# Patient Record
Sex: MALE | Race: BLACK OR AFRICAN AMERICAN | Employment: OTHER | URBAN - METROPOLITAN AREA
[De-identification: names, ages, dates, MRNs, and addresses within clinical notes are randomized per-mention and may not be internally consistent; named-entity substitution may affect disease eponyms.]

---

## 2021-03-05 ENCOUNTER — HOSPITAL ENCOUNTER (INPATIENT)
Age: 41
LOS: 4 days | Discharge: HOME | DRG: 286 | End: 2021-03-09
Attending: EMERGENCY MEDICINE | Admitting: INTERNAL MEDICINE
Payer: COMMERCIAL

## 2021-03-05 ENCOUNTER — APPOINTMENT (OUTPATIENT)
Dept: GENERAL RADIOLOGY | Age: 41
DRG: 286 | End: 2021-03-05
Attending: EMERGENCY MEDICINE
Payer: COMMERCIAL

## 2021-03-05 ENCOUNTER — APPOINTMENT (OUTPATIENT)
Dept: CT IMAGING | Age: 41
DRG: 286 | End: 2021-03-05
Attending: EMERGENCY MEDICINE
Payer: COMMERCIAL

## 2021-03-05 ENCOUNTER — NURSE TRIAGE (OUTPATIENT)
Dept: OTHER | Facility: CLINIC | Age: 41
End: 2021-03-05

## 2021-03-05 DIAGNOSIS — I10 ESSENTIAL HYPERTENSION: ICD-10-CM

## 2021-03-05 DIAGNOSIS — I50.23 SYSTOLIC CHF, ACUTE ON CHRONIC (HCC): ICD-10-CM

## 2021-03-05 DIAGNOSIS — I42.9 CARDIOMYOPATHY (HCC): ICD-10-CM

## 2021-03-05 DIAGNOSIS — R06.02 SOB (SHORTNESS OF BREATH): ICD-10-CM

## 2021-03-05 DIAGNOSIS — I50.20 SYSTOLIC CONGESTIVE HEART FAILURE, UNSPECIFIED HF CHRONICITY (HCC): ICD-10-CM

## 2021-03-05 DIAGNOSIS — Z20.822 SUSPECTED COVID-19 VIRUS INFECTION: Primary | ICD-10-CM

## 2021-03-05 DIAGNOSIS — R06.09 DOE (DYSPNEA ON EXERTION): ICD-10-CM

## 2021-03-05 PROBLEM — I50.21 SYSTOLIC CHF, ACUTE (HCC): Status: ACTIVE | Noted: 2021-03-05

## 2021-03-05 PROBLEM — R73.03 PREDIABETES: Status: ACTIVE | Noted: 2021-03-05

## 2021-03-05 LAB
ALBUMIN SERPL-MCNC: 3.2 G/DL (ref 3.4–5)
ALBUMIN/GLOB SERPL: 0.9 {RATIO} (ref 0.8–1.7)
ALP SERPL-CCNC: 129 U/L (ref 45–117)
ALT SERPL-CCNC: 58 U/L (ref 16–61)
ANION GAP SERPL CALC-SCNC: 6 MMOL/L (ref 3–18)
APPEARANCE UR: CLEAR
APTT PPP: 31.5 SEC (ref 23–36.4)
ARTERIAL PATENCY WRIST A: YES
AST SERPL-CCNC: 33 U/L (ref 10–38)
ATRIAL RATE: 99 BPM
BASE EXCESS BLD CALC-SCNC: 1 MMOL/L
BASOPHILS # BLD: 0 K/UL (ref 0–0.1)
BASOPHILS NFR BLD: 0 % (ref 0–2)
BDY SITE: ABNORMAL
BILIRUB SERPL-MCNC: 0.6 MG/DL (ref 0.2–1)
BILIRUB UR QL: NEGATIVE
BNP SERPL-MCNC: 1097 PG/ML (ref 0–450)
BUN SERPL-MCNC: 15 MG/DL (ref 7–18)
BUN/CREAT SERPL: 16 (ref 12–20)
CALCIUM SERPL-MCNC: 8.3 MG/DL (ref 8.5–10.1)
CALCULATED R AXIS, ECG10: -12 DEGREES
CALCULATED T AXIS, ECG11: -103 DEGREES
CHLORIDE SERPL-SCNC: 107 MMOL/L (ref 100–111)
CK MB CFR SERPL CALC: 1.2 % (ref 0–4)
CK MB SERPL-MCNC: 1.7 NG/ML (ref 5–25)
CK SERPL-CCNC: 141 U/L (ref 39–308)
CO2 SERPL-SCNC: 25 MMOL/L (ref 21–32)
COLOR UR: YELLOW
CREAT SERPL-MCNC: 0.95 MG/DL (ref 0.6–1.3)
D DIMER PPP FEU-MCNC: 1.4 UG/ML(FEU)
DIAGNOSIS, 93000: NORMAL
DIFFERENTIAL METHOD BLD: ABNORMAL
EOSINOPHIL # BLD: 0.2 K/UL (ref 0–0.4)
EOSINOPHIL NFR BLD: 3 % (ref 0–5)
ERYTHROCYTE [DISTWIDTH] IN BLOOD BY AUTOMATED COUNT: 13.8 % (ref 11.6–14.5)
EST. AVERAGE GLUCOSE BLD GHB EST-MCNC: 117 MG/DL
GAS FLOW.O2 O2 DELIVERY SYS: ABNORMAL L/MIN
GLOBULIN SER CALC-MCNC: 3.4 G/DL (ref 2–4)
GLUCOSE BLD STRIP.AUTO-MCNC: 187 MG/DL (ref 70–110)
GLUCOSE BLD STRIP.AUTO-MCNC: 223 MG/DL (ref 70–110)
GLUCOSE SERPL-MCNC: 181 MG/DL (ref 74–99)
GLUCOSE UR STRIP.AUTO-MCNC: NEGATIVE MG/DL
HBA1C MFR BLD: 5.7 % (ref 4.2–5.6)
HCO3 BLD-SCNC: 24.9 MMOL/L (ref 22–26)
HCT VFR BLD AUTO: 37.7 % (ref 36–48)
HGB BLD-MCNC: 12.2 G/DL (ref 13–16)
HGB UR QL STRIP: NEGATIVE
INR PPP: 1.2 (ref 0.8–1.2)
KETONES UR QL STRIP.AUTO: NEGATIVE MG/DL
LACTATE BLD-SCNC: 1.46 MMOL/L (ref 0.4–2)
LEUKOCYTE ESTERASE UR QL STRIP.AUTO: NEGATIVE
LYMPHOCYTES # BLD: 2 K/UL (ref 0.9–3.6)
LYMPHOCYTES NFR BLD: 23 % (ref 21–52)
MAGNESIUM SERPL-MCNC: 2.1 MG/DL (ref 1.6–2.6)
MCH RBC QN AUTO: 29.3 PG (ref 24–34)
MCHC RBC AUTO-ENTMCNC: 32.4 G/DL (ref 31–37)
MCV RBC AUTO: 90.4 FL (ref 74–97)
MONOCYTES # BLD: 0.6 K/UL (ref 0.05–1.2)
MONOCYTES NFR BLD: 7 % (ref 3–10)
NEUTS SEG # BLD: 5.8 K/UL (ref 1.8–8)
NEUTS SEG NFR BLD: 67 % (ref 40–73)
NITRITE UR QL STRIP.AUTO: NEGATIVE
O2/TOTAL GAS SETTING VFR VENT: 21 %
P-R INTERVAL, ECG05: 162 MS
PCO2 BLD: 36.8 MMHG (ref 35–45)
PH BLD: 7.44 [PH] (ref 7.35–7.45)
PH UR STRIP: 6 [PH] (ref 5–8)
PLATELET # BLD AUTO: 261 K/UL (ref 135–420)
PMV BLD AUTO: 12.2 FL (ref 9.2–11.8)
PO2 BLD: 73 MMHG (ref 80–100)
POTASSIUM SERPL-SCNC: 4.3 MMOL/L (ref 3.5–5.5)
PROT SERPL-MCNC: 6.6 G/DL (ref 6.4–8.2)
PROT UR STRIP-MCNC: NEGATIVE MG/DL
PROTHROMBIN TIME: 15 SEC (ref 11.5–15.2)
Q-T INTERVAL, ECG07: 368 MS
QRS DURATION, ECG06: 92 MS
QTC CALCULATION (BEZET), ECG08: 472 MS
RBC # BLD AUTO: 4.17 M/UL (ref 4.7–5.5)
SAO2 % BLD: 95 % (ref 92–97)
SARS-COV-2, COV2: NORMAL
SERVICE CMNT-IMP: ABNORMAL
SODIUM SERPL-SCNC: 138 MMOL/L (ref 136–145)
SP GR UR REFRACTOMETRY: 1.01 (ref 1–1.03)
SPECIMEN TYPE: ABNORMAL
TOTAL RESP. RATE, ITRR: 26
TROPONIN I SERPL-MCNC: 0.02 NG/ML (ref 0–0.04)
UROBILINOGEN UR QL STRIP.AUTO: 1 EU/DL (ref 0.2–1)
VENTRICULAR RATE, ECG03: 99 BPM
WBC # BLD AUTO: 8.6 K/UL (ref 4.6–13.2)

## 2021-03-05 PROCEDURE — 85379 FIBRIN DEGRADATION QUANT: CPT

## 2021-03-05 PROCEDURE — 71045 X-RAY EXAM CHEST 1 VIEW: CPT

## 2021-03-05 PROCEDURE — 82803 BLOOD GASES ANY COMBINATION: CPT

## 2021-03-05 PROCEDURE — 71275 CT ANGIOGRAPHY CHEST: CPT

## 2021-03-05 PROCEDURE — 74011636637 HC RX REV CODE- 636/637: Performed by: INTERNAL MEDICINE

## 2021-03-05 PROCEDURE — 82553 CREATINE MB FRACTION: CPT

## 2021-03-05 PROCEDURE — 74011250637 HC RX REV CODE- 250/637: Performed by: INTERNAL MEDICINE

## 2021-03-05 PROCEDURE — 87040 BLOOD CULTURE FOR BACTERIA: CPT

## 2021-03-05 PROCEDURE — 65660000000 HC RM CCU STEPDOWN

## 2021-03-05 PROCEDURE — 36600 WITHDRAWAL OF ARTERIAL BLOOD: CPT

## 2021-03-05 PROCEDURE — 80053 COMPREHEN METABOLIC PANEL: CPT

## 2021-03-05 PROCEDURE — U0003 INFECTIOUS AGENT DETECTION BY NUCLEIC ACID (DNA OR RNA); SEVERE ACUTE RESPIRATORY SYNDROME CORONAVIRUS 2 (SARS-COV-2) (CORONAVIRUS DISEASE [COVID-19]), AMPLIFIED PROBE TECHNIQUE, MAKING USE OF HIGH THROUGHPUT TECHNOLOGIES AS DESCRIBED BY CMS-2020-01-R: HCPCS

## 2021-03-05 PROCEDURE — 74011000636 HC RX REV CODE- 636: Performed by: EMERGENCY MEDICINE

## 2021-03-05 PROCEDURE — 85025 COMPLETE CBC W/AUTO DIFF WBC: CPT

## 2021-03-05 PROCEDURE — 99285 EMERGENCY DEPT VISIT HI MDM: CPT

## 2021-03-05 PROCEDURE — 83605 ASSAY OF LACTIC ACID: CPT

## 2021-03-05 PROCEDURE — 74011250636 HC RX REV CODE- 250/636: Performed by: INTERNAL MEDICINE

## 2021-03-05 PROCEDURE — 74011250636 HC RX REV CODE- 250/636: Performed by: EMERGENCY MEDICINE

## 2021-03-05 PROCEDURE — 83880 ASSAY OF NATRIURETIC PEPTIDE: CPT

## 2021-03-05 PROCEDURE — 85610 PROTHROMBIN TIME: CPT

## 2021-03-05 PROCEDURE — 96374 THER/PROPH/DIAG INJ IV PUSH: CPT

## 2021-03-05 PROCEDURE — 94762 N-INVAS EAR/PLS OXIMTRY CONT: CPT

## 2021-03-05 PROCEDURE — 83735 ASSAY OF MAGNESIUM: CPT

## 2021-03-05 PROCEDURE — 96375 TX/PRO/DX INJ NEW DRUG ADDON: CPT

## 2021-03-05 PROCEDURE — 81003 URINALYSIS AUTO W/O SCOPE: CPT

## 2021-03-05 PROCEDURE — 83036 HEMOGLOBIN GLYCOSYLATED A1C: CPT

## 2021-03-05 PROCEDURE — 82962 GLUCOSE BLOOD TEST: CPT

## 2021-03-05 PROCEDURE — 93005 ELECTROCARDIOGRAM TRACING: CPT

## 2021-03-05 PROCEDURE — 85730 THROMBOPLASTIN TIME PARTIAL: CPT

## 2021-03-05 RX ORDER — MELATONIN
1000 DAILY
Status: DISCONTINUED | OUTPATIENT
Start: 2021-03-06 | End: 2021-03-06

## 2021-03-05 RX ORDER — FUROSEMIDE 10 MG/ML
40 INJECTION INTRAMUSCULAR; INTRAVENOUS
Status: COMPLETED | OUTPATIENT
Start: 2021-03-05 | End: 2021-03-05

## 2021-03-05 RX ORDER — SODIUM CHLORIDE 0.9 % (FLUSH) 0.9 %
5-40 SYRINGE (ML) INJECTION AS NEEDED
Status: DISCONTINUED | OUTPATIENT
Start: 2021-03-05 | End: 2021-03-06

## 2021-03-05 RX ORDER — ASCORBIC ACID 250 MG
500 TABLET ORAL DAILY
Status: DISCONTINUED | OUTPATIENT
Start: 2021-03-06 | End: 2021-03-06

## 2021-03-05 RX ORDER — DEXAMETHASONE SODIUM PHOSPHATE 4 MG/ML
6 INJECTION, SOLUTION INTRA-ARTICULAR; INTRALESIONAL; INTRAMUSCULAR; INTRAVENOUS; SOFT TISSUE ONCE
Status: COMPLETED | OUTPATIENT
Start: 2021-03-05 | End: 2021-03-05

## 2021-03-05 RX ORDER — ACETAMINOPHEN 650 MG/1
650 SUPPOSITORY RECTAL
Status: DISCONTINUED | OUTPATIENT
Start: 2021-03-05 | End: 2021-03-09 | Stop reason: HOSPADM

## 2021-03-05 RX ORDER — ONDANSETRON 2 MG/ML
4 INJECTION INTRAMUSCULAR; INTRAVENOUS
Status: DISCONTINUED | OUTPATIENT
Start: 2021-03-05 | End: 2021-03-09 | Stop reason: HOSPADM

## 2021-03-05 RX ORDER — SODIUM CHLORIDE 0.9 % (FLUSH) 0.9 %
5-40 SYRINGE (ML) INJECTION EVERY 8 HOURS
Status: DISCONTINUED | OUTPATIENT
Start: 2021-03-05 | End: 2021-03-06

## 2021-03-05 RX ORDER — INSULIN LISPRO 100 [IU]/ML
INJECTION, SOLUTION INTRAVENOUS; SUBCUTANEOUS
Status: DISCONTINUED | OUTPATIENT
Start: 2021-03-05 | End: 2021-03-06

## 2021-03-05 RX ORDER — ZINC SULFATE 50(220)MG
1 CAPSULE ORAL DAILY
Status: DISCONTINUED | OUTPATIENT
Start: 2021-03-06 | End: 2021-03-06

## 2021-03-05 RX ORDER — PROMETHAZINE HYDROCHLORIDE 25 MG/1
12.5 TABLET ORAL
Status: DISCONTINUED | OUTPATIENT
Start: 2021-03-05 | End: 2021-03-09 | Stop reason: HOSPADM

## 2021-03-05 RX ORDER — MAGNESIUM SULFATE 100 %
4 CRYSTALS MISCELLANEOUS AS NEEDED
Status: DISCONTINUED | OUTPATIENT
Start: 2021-03-05 | End: 2021-03-06

## 2021-03-05 RX ORDER — CARVEDILOL 3.12 MG/1
3.12 TABLET ORAL 2 TIMES DAILY WITH MEALS
Status: DISCONTINUED | OUTPATIENT
Start: 2021-03-05 | End: 2021-03-06

## 2021-03-05 RX ORDER — DEXTROSE MONOHYDRATE 100 MG/ML
125-250 INJECTION, SOLUTION INTRAVENOUS AS NEEDED
Status: DISCONTINUED | OUTPATIENT
Start: 2021-03-05 | End: 2021-03-06

## 2021-03-05 RX ORDER — DEXAMETHASONE 4 MG/1
6 TABLET ORAL DAILY
Status: DISCONTINUED | OUTPATIENT
Start: 2021-03-05 | End: 2021-03-07

## 2021-03-05 RX ORDER — SODIUM CHLORIDE 0.9 % (FLUSH) 0.9 %
5-10 SYRINGE (ML) INJECTION AS NEEDED
Status: DISCONTINUED | OUTPATIENT
Start: 2021-03-05 | End: 2021-03-09 | Stop reason: HOSPADM

## 2021-03-05 RX ORDER — BUDESONIDE AND FORMOTEROL FUMARATE DIHYDRATE 80; 4.5 UG/1; UG/1
2 AEROSOL RESPIRATORY (INHALATION)
Status: DISCONTINUED | OUTPATIENT
Start: 2021-03-05 | End: 2021-03-06

## 2021-03-05 RX ORDER — POLYETHYLENE GLYCOL 3350 17 G/17G
17 POWDER, FOR SOLUTION ORAL DAILY PRN
Status: DISCONTINUED | OUTPATIENT
Start: 2021-03-05 | End: 2021-03-09 | Stop reason: HOSPADM

## 2021-03-05 RX ORDER — ACETAMINOPHEN 325 MG/1
650 TABLET ORAL
Status: DISCONTINUED | OUTPATIENT
Start: 2021-03-05 | End: 2021-03-09 | Stop reason: HOSPADM

## 2021-03-05 RX ORDER — LEVOFLOXACIN 5 MG/ML
750 INJECTION, SOLUTION INTRAVENOUS EVERY 24 HOURS
Status: DISCONTINUED | OUTPATIENT
Start: 2021-03-05 | End: 2021-03-09 | Stop reason: HOSPADM

## 2021-03-05 RX ORDER — FUROSEMIDE 10 MG/ML
40 INJECTION INTRAMUSCULAR; INTRAVENOUS DAILY
Status: DISCONTINUED | OUTPATIENT
Start: 2021-03-06 | End: 2021-03-07

## 2021-03-05 RX ORDER — LOSARTAN POTASSIUM 25 MG/1
25 TABLET ORAL DAILY
Status: DISCONTINUED | OUTPATIENT
Start: 2021-03-06 | End: 2021-03-06

## 2021-03-05 RX ORDER — ENOXAPARIN SODIUM 100 MG/ML
30 INJECTION SUBCUTANEOUS EVERY 12 HOURS
Status: DISCONTINUED | OUTPATIENT
Start: 2021-03-05 | End: 2021-03-07

## 2021-03-05 RX ADMIN — DEXAMETHASONE 6 MG: 4 TABLET ORAL at 14:55

## 2021-03-05 RX ADMIN — ENOXAPARIN SODIUM 30 MG: 30 INJECTION SUBCUTANEOUS at 14:56

## 2021-03-05 RX ADMIN — CARVEDILOL 3.12 MG: 3.12 TABLET, FILM COATED ORAL at 19:03

## 2021-03-05 RX ADMIN — INSULIN LISPRO 4 UNITS: 100 INJECTION, SOLUTION INTRAVENOUS; SUBCUTANEOUS at 22:25

## 2021-03-05 RX ADMIN — INSULIN LISPRO 2 UNITS: 100 INJECTION, SOLUTION INTRAVENOUS; SUBCUTANEOUS at 16:30

## 2021-03-05 RX ADMIN — DEXAMETHASONE SODIUM PHOSPHATE 6 MG: 4 INJECTION, SOLUTION INTRAMUSCULAR; INTRAVENOUS at 12:30

## 2021-03-05 RX ADMIN — IOPAMIDOL 100 ML: 755 INJECTION, SOLUTION INTRAVENOUS at 10:57

## 2021-03-05 RX ADMIN — FUROSEMIDE 40 MG: 10 INJECTION, SOLUTION INTRAMUSCULAR; INTRAVENOUS at 12:31

## 2021-03-05 RX ADMIN — LEVOFLOXACIN 750 MG: 5 INJECTION, SOLUTION INTRAVENOUS at 12:31

## 2021-03-05 RX ADMIN — Medication 5 ML: at 14:00

## 2021-03-05 RX ADMIN — BUDESONIDE AND FORMOTEROL FUMARATE DIHYDRATE 2 PUFF: 80; 4.5 AEROSOL RESPIRATORY (INHALATION) at 22:37

## 2021-03-05 NOTE — PROGRESS NOTES
1400: pt arrives to the unit accompanied by ER RN, pt walks to the bed and sits at the bedside, MD Ryan Soliman obtaining H&P, discussing condition and tx plan, continue to monitor pt for any change  Melchor Mann RN

## 2021-03-05 NOTE — ED PROVIDER NOTES
EMERGENCY DEPARTMENT HISTORY AND PHYSICAL EXAM    Date: 3/5/2021  Patient Name: Alex Chappell    History of Presenting Illness     Chief Complaint   Patient presents with   • Shortness of Breath   • Cough         History Provided By: Patient    9:04 AM  Alex Chappell is a 41 y.o. male with PMHX of hypertension, high cholesterol, active tobacco smoker, recently had an abnormal echo and is scheduled for cardiac catheterization on Monday who presents to the emergency department C/O shortness of breath.  Per patient he has had worsening shortness of breath for 2 weeks.  He reports he has been tested for coronavirus 2-3 times and it has been negative.  He reports that shortness of breath is worse when he lays flat or exerts himself.  He reports today he started to have a slight cough.  He denies any fever, chest pain, nausea or vomiting, bowel or urinary complaints, lower extremity edema.  No known sick contacts or recent travel.  Denies significant family history of heart disease or blood clots.     PCP: Sanjuana Ramsey PA    Current Facility-Administered Medications   Medication Dose Route Frequency Provider Last Rate Last Admin   • sodium chloride (NS) flush 5-10 mL  5-10 mL IntraVENous PRN Annette Ruiz MD       • levoFLOXacin (LEVAQUIN) 750 mg in D5W IVPB  750 mg IntraVENous Q24H Annette Ruiz  mL/hr at 03/05/21 1231 750 mg at 03/05/21 1231     Current Outpatient Medications   Medication Sig Dispense Refill   • albuterol (PROVENTIL HFA, VENTOLIN HFA, PROAIR HFA) 90 mcg/actuation inhaler Take 2 Puffs by inhalation every four (4) hours as needed for Wheezing.     • atorvastatin (LIPITOR) 20 mg tablet Take 20 mg by mouth daily.     • carvediloL (COREG) 3.125 mg tablet Take 3.125 mg by mouth two (2) times daily (with meals).     • losartan (COZAAR) 25 mg tablet Take 25 mg by mouth daily.     • budesonide-formoteroL (Symbicort) 80-4.5 mcg/actuation HFAA Take 2 Puffs by inhalation two (2)  times a day. Past History     Past Medical History:  No past medical history on file. Past Surgical History:  No past surgical history on file. Family History:  No family history on file. Social History:  Social History     Tobacco Use    Smoking status: Not on file   Substance Use Topics    Alcohol use: No    Drug use: No       Allergies: Allergies   Allergen Reactions    Codeine Other (comments)     \"I usually blackout,I feel like I am going to die. It makes me vomit. \"    Keflex [Cephalexin] Other (comments)     Rectal itching         Review of Systems   Review of Systems   Constitutional: Negative for fever. Respiratory: Positive for cough and shortness of breath. Cardiovascular: Negative for chest pain. All other systems reviewed and are negative.         Physical Exam     Vitals:    03/05/21 0857 03/05/21 0930 03/05/21 1030   BP: (!) 146/100 (!) 145/96 (!) 153/83   Pulse: (!) 102 100 (!) 102   Resp: 20 (!) 31 (!) 34   Temp: 97.9 °F (36.6 °C)     SpO2: 98% 94% 92%   Weight: 105.7 kg (233 lb)     Height: 5' 6\" (1.676 m)       Physical Exam    Nursing notes and vital signs reviewed    Constitutional: Non toxic appearing, moderate distress  Head: Normocephalic, Atraumatic  Eyes: EOMI  Neck: Supple  Cardiovascular: Tachycardic and regular rhythm, no murmurs, rubs, or gallops  Chest: Increased work of breathing and equal chest excursion bilaterally, tachypneic  Lungs: Diminished to ausculation bilaterally  Abdomen: Soft, non tender, non distended  Back: No evidence of trauma or deformity  Extremities: No evidence of trauma or deformity, no LE edema  Skin: Warm and dry, normal cap refill  Neuro: Alert and appropriate  Psychiatric: Normal mood and affect      Diagnostic Study Results     Labs -     Recent Results (from the past 12 hour(s))   EKG, 12 LEAD, INITIAL    Collection Time: 03/05/21  9:03 AM   Result Value Ref Range    Ventricular Rate 99 BPM    Atrial Rate 99 BPM    P-R Interval 162 ms    QRS Duration 92 ms    Q-T Interval 368 ms    QTC Calculation (Bezet) 472 ms    Calculated R Axis -12 degrees    Calculated T Axis -103 degrees    Diagnosis       Normal sinus rhythm  Incomplete right bundle branch block  Moderate voltage criteria for LVH, may be normal variant  Cannot rule out Septal infarct , age undetermined  Abnormal ECG  No previous ECGs available     CBC WITH AUTOMATED DIFF    Collection Time: 03/05/21  9:12 AM   Result Value Ref Range    WBC 8.6 4.6 - 13.2 K/uL    RBC 4.17 (L) 4.70 - 5.50 M/uL    HGB 12.2 (L) 13.0 - 16.0 g/dL    HCT 37.7 36.0 - 48.0 %    MCV 90.4 74.0 - 97.0 FL    MCH 29.3 24.0 - 34.0 PG    MCHC 32.4 31.0 - 37.0 g/dL    RDW 13.8 11.6 - 14.5 %    PLATELET 745 900 - 920 K/uL    MPV 12.2 (H) 9.2 - 11.8 FL    NEUTROPHILS 67 40 - 73 %    LYMPHOCYTES 23 21 - 52 %    MONOCYTES 7 3 - 10 %    EOSINOPHILS 3 0 - 5 %    BASOPHILS 0 0 - 2 %    ABS. NEUTROPHILS 5.8 1.8 - 8.0 K/UL    ABS. LYMPHOCYTES 2.0 0.9 - 3.6 K/UL    ABS. MONOCYTES 0.6 0.05 - 1.2 K/UL    ABS. EOSINOPHILS 0.2 0.0 - 0.4 K/UL    ABS. BASOPHILS 0.0 0.0 - 0.1 K/UL    DF AUTOMATED     CARDIAC PANEL,(CK, CKMB & TROPONIN)    Collection Time: 03/05/21  9:12 AM   Result Value Ref Range    CK - MB 1.7 <3.6 ng/ml    CK-MB Index 1.2 0.0 - 4.0 %     39 - 308 U/L    Troponin-I, QT 0.02 0.0 - 9.849 NG/ML   METABOLIC PANEL, COMPREHENSIVE    Collection Time: 03/05/21  9:12 AM   Result Value Ref Range    Sodium 138 136 - 145 mmol/L    Potassium 4.3 3.5 - 5.5 mmol/L    Chloride 107 100 - 111 mmol/L    CO2 25 21 - 32 mmol/L    Anion gap 6 3.0 - 18 mmol/L    Glucose 181 (H) 74 - 99 mg/dL    BUN 15 7.0 - 18 MG/DL    Creatinine 0.95 0.6 - 1.3 MG/DL    BUN/Creatinine ratio 16 12 - 20      GFR est AA >60 >60 ml/min/1.73m2    GFR est non-AA >60 >60 ml/min/1.73m2    Calcium 8.3 (L) 8.5 - 10.1 MG/DL    Bilirubin, total 0.6 0.2 - 1.0 MG/DL    ALT (SGPT) 58 16 - 61 U/L    AST (SGOT) 33 10 - 38 U/L    Alk.  phosphatase 129 (H) 45 - 117 U/L    Protein, total 6.6 6.4 - 8.2 g/dL    Albumin 3.2 (L) 3.4 - 5.0 g/dL    Globulin 3.4 2.0 - 4.0 g/dL    A-G Ratio 0.9 0.8 - 1.7     MAGNESIUM    Collection Time: 03/05/21  9:12 AM   Result Value Ref Range    Magnesium 2.1 1.6 - 2.6 mg/dL   PROTHROMBIN TIME + INR    Collection Time: 03/05/21  9:12 AM   Result Value Ref Range    Prothrombin time 15.0 11.5 - 15.2 sec    INR 1.2 0.8 - 1.2     PTT    Collection Time: 03/05/21  9:12 AM   Result Value Ref Range    aPTT 31.5 23.0 - 36.4 SEC   D DIMER    Collection Time: 03/05/21  9:12 AM   Result Value Ref Range    D DIMER 1.40 (H) <0.46 ug/ml(FEU)   NT-PRO BNP    Collection Time: 03/05/21  9:12 AM   Result Value Ref Range    NT pro-BNP 1,097 (H) 0 - 450 PG/ML   POC LACTIC ACID    Collection Time: 03/05/21  9:17 AM   Result Value Ref Range    Lactic Acid (POC) 1.46 0.40 - 2.00 mmol/L   SARS-COV-2    Collection Time: 03/05/21 10:22 AM   Result Value Ref Range    SARS-CoV-2 Please find results under separate order     URINALYSIS W/ RFLX MICROSCOPIC    Collection Time: 03/05/21 10:23 AM   Result Value Ref Range    Color YELLOW      Appearance CLEAR      Specific gravity 1.007 1.005 - 1.030      pH (UA) 6.0 5.0 - 8.0      Protein Negative NEG mg/dL    Glucose Negative NEG mg/dL    Ketone Negative NEG mg/dL    Bilirubin Negative NEG      Blood Negative NEG      Urobilinogen 1.0 0.2 - 1.0 EU/dL    Nitrites Negative NEG      Leukocyte Esterase Negative NEG         Radiologic Studies -   CTA CHEST W OR W WO CONT   Final Result      No evidence of pulmonary embolism. Bilateral scattered groundglass opacities, may reflect atypical pneumonia. Trace left pleural effusion. Mediastinal/hilar adenopathy, likely reactive. Left axillary skin thickening and prominence of adjacent lymph nodes measuring   up to 1 cm short axis. XR CHEST PORT   Final Result      Cardiomegaly. Patchy bilateral parenchymal airspace opacities.          CT Results  (Last 48 hours)               03/05/21 1108  CTA CHEST W OR W WO CONT Final result    Impression:      No evidence of pulmonary embolism. Bilateral scattered groundglass opacities, may reflect atypical pneumonia. Trace left pleural effusion. Mediastinal/hilar adenopathy, likely reactive. Left axillary skin thickening and prominence of adjacent lymph nodes measuring   up to 1 cm short axis. Narrative:  EXAM: CTA Chest       INDICATION: Shortness of breath. Possible PE.       COMPARISON: No prior study. TECHNIQUE: Axial CT imaging from the thoracic inlet through the diaphragm with   intravenous contrast utilizing CTA study for pulmonary artery evaluation. Coronal and sagittal MIP reformations were generated at a separate workstation. One or more dose reduction techniques were used on this CT: automated exposure   control, adjustment of the mAs and/or kVp according to patient size, and   iterative reconstruction techniques. The specific techniques used on this CT   exam have been documented in the patient's electronic medical record. Digital   Imaging and Communications in Medicine (DICOM) format image data are available   to nonaffiliated external healthcare facilities or entities on a secure, media   free, reciprocally searchable basis with patient authorization for at least a   12-month period after this study. _______________       FINDINGS:       EXAM QUALITY: Overall exam quality is adequate. Pulmonary arterial enhancement   is adequate. The breath hold is satisfactory. Respiratory motion artifact is   present, limiting evaluation. PULMONARY ARTERIES: No convincing evidence of pulmonary embolism. MEDIASTINUM: Cardiomegaly. No evidence of right heart strain. Aorta is   unremarkable. No pericardial effusion. LYMPH NODES: Hilar and mediastinal adenopathy . AIRWAY: Unremarkable. LUNGS: Bilateral scattered groundglass opacities.        PLEURA: Trace pleural effusion. UPPER ABDOMEN: Visualized upper abdomen is unremarkable. .       OTHER: Gynecomastia. Left axillary skin thickening and prominence of adjacent   lymph nodes measuring up to 1 cm short axis. _______________               CXR Results  (Last 48 hours)               03/05/21 0925  XR CHEST PORT Final result    Impression:      Cardiomegaly. Patchy bilateral parenchymal airspace opacities. Narrative:  EXAM: XR CHEST PORT       CLINICAL INDICATION/HISTORY: meets SIRS criteria   -Additional: None       COMPARISON: None       TECHNIQUE: Portable frontal view of the chest       _______________       FINDINGS:       SUPPORT DEVICES: None. HEART AND MEDIASTINUM: Cardiomegaly. LUNGS AND PLEURAL SPACES: Patchy bilateral parenchymal airspace opacities. No   large effusion or pneumothorax.       _______________                 Medications given in the ED-  Medications   sodium chloride (NS) flush 5-10 mL (has no administration in time range)   levoFLOXacin (LEVAQUIN) 750 mg in D5W IVPB (750 mg IntraVENous New Bag 3/5/21 1231)   iopamidoL (ISOVUE-370) 76 % injection 100 mL (100 mL IntraVENous Given 3/5/21 1057)   dexamethasone (DECADRON) 4 mg/mL injection 6 mg (6 mg IntraVENous Given 3/5/21 1230)   furosemide (LASIX) injection 40 mg (40 mg IntraVENous Given 3/5/21 1231)         Medical Decision Making   I am the first provider for this patient. I reviewed the vital signs, available nursing notes, past medical history, past surgical history, family history and social history. Vital Signs-Reviewed the patient's vital signs.     Pulse Oximetry Analysis - 98% on room air, not hypoxic     Cardiac Monitor:  Rate: 98 bpm  Rhythm: Normal sinus    EKG interpretation: (Preliminary)  EKG read by Dr. Bo Martin at 9:08 AM  Normal sinus rhythm at a rate of 99 bpm, WV interval 162 ms, QRS duration of 92 ms, no prior available for comparison    Records Reviewed: Nursing Notes    Provider Notes (Medical Decision Making): Wanda Bess is a 46 y.o. male presenting for shortness of breath and dyspnea on exertion. Imaging concerning for COVID-19 infection the patient has had multiple negative Covid test during the course of these symptoms. proBNP and D-dimer elevated. CTA without PE. Discussed with cardiology and will initiate diuresis and discussed with hospitalist for further in-hospital evaluation and management. Patient understands and agrees with this plan. Procedures:  Procedures    ED Course:   CONSULT NOTE:   12:09 PM  Dr. Bart Prince spoke with Dr. Cherl Frankel   Specialty: Cardiology  Discussed pt's hx, disposition, and available diagnostic and imaging results over the telephone. Reviewed care plans. Agrees with observation, lasix 40mg IV BID, hold on cath until covid test result. CONSULT NOTE:   12:41 PM  Dr. Bart Prince spoke with Dr. Yanet Coronado   Specialty: Hospitalist  Discussed pt's hx, disposition, and available diagnostic and imaging results over the telephone. Reviewed care plans. Accepts to telemetry. 12:45 PM  Updated patient on all results and plan. All questions answered. Diagnosis and Disposition     Critical Care Time: None    Core Measures:  For Hospitalized Patients:    1. Hospitalization Decision Time:  The decision to hospitalize the patient was made by Dr. Bart Prince at 12:09 PM on 3/5/2021    2. Aspirin: Aspirin was not given because the patient did not present with a stroke at the time of their Emergency Department evaluation    12:43 PM  Patient is being admitted to the hospital by Dr. Nancy Carbajal. The results of their tests and reasons for their admission have been discussed with them and/or available family. They convey agreement and understanding for the need to be admitted and for their admission diagnosis. CONDITIONS ON ADMISSION:  Sepsis is not present at the time of admission. Deep Vein Thrombosis is not present at the time of admission. Thrombosis is not present at the time of admission. Urinary Tract Infection is not present at the time of admission. Pneumonia is present at the time of admission. MRSA is not present at the time of admission. Wound infection is not present at the time of admission. Pressure Ulcer is not present at the time of admission. CLINICAL IMPRESSION:    1. Suspected COVID-19 virus infection    2. RIOJAS (dyspnea on exertion)    3. SOB (shortness of breath)      _______________________________      Please note that this dictation was completed with [employer] the computer voice recognition software. Quite often unanticipated grammatical, syntax, homophones, and other interpretive errors are inadvertently transcribed by the computer software. Please disregard these errors. Please excuse any errors that have escaped final proofreading.

## 2021-03-05 NOTE — ED NOTES
TRANSFER - OUT REPORT:    Verbal report given to student nurse/ Ursula Parson RN(name) on Amanda Palm  being transferred to 354(unit) for routine progression of care       Report consisted of patients Situation, Background, Assessment and   Recommendations(SBAR). Information from the following report(s) SBAR, Kardex and ED Summary was reviewed with the receiving nurse. Lines:   Peripheral IV 03/05/21 Right Antecubital (Active)   Site Assessment Clean, dry, & intact 03/05/21 0935   Hub Color/Line Status Green 03/05/21 0935        Opportunity for questions and clarification was provided.       Patient transported with:   Registered Nurse

## 2021-03-05 NOTE — TELEPHONE ENCOUNTER
Brief description of triage: Cough with shortness of breath for the past month, EF 25%, Cath scheduled on Monday for further diagnostics. Has been up all night coughing with shortness of breath, unable to speak with triager due to shortness of breath. Reports moderate shortness of breath that is worse than normal. Denies fever, has had 2 negative Covid tests. Triage indicates for patient to Go to ED Now, evaluate causes for shortness of breath. Someone else should drive and take current medications along. Care advice given per protocol, proceed to ED ASAP. Care advice provided, patient verbalizes understanding; denies any other questions or concerns; instructed to call back for any new or worsening symptoms. Attention Provider: Thank you for allowing me to participate in the care of your patient. The patient was connected to triage in response to symptoms provided. Please do not respond through this encounter as the response is not directed to a shared pool. Reason for Disposition   [1] MODERATE difficulty breathing (e.g., speaks in phrases, SOB even at rest, pulse 100-120) AND [2] NEW-onset or WORSE than normal    Answer Assessment - Initial Assessment Questions  1. RESPIRATORY STATUS: \"Describe your breathing? \" (e.g., wheezing, shortness of breath, unable to speak, severe coughing)       Severe coughing, unable to speak to traiger    2. ONSET: \"When did this breathing problem begin? \"       All night    3. PATTERN \"Does the difficult breathing come and go, or has it been constant since it started? \"       Constant    4. SEVERITY: \"How bad is your breathing? \" (e.g., mild, moderate, severe)     - MILD: No SOB at rest, mild SOB with walking, speaks normally in sentences, can lay down, no retractions, pulse < 100.     - MODERATE: SOB at rest, SOB with minimal exertion and prefers to sit, cannot lie down flat, speaks in phrases, mild retractions, audible wheezing, pulse 100-120.     - SEVERE: Very SOB at rest, speaks in single words, struggling to breathe, sitting hunched forward, retractions, pulse > 120       Moderate    5. RECURRENT SYMPTOM: \"Have you had difficulty breathing before? \" If so, ask: \"When was the last time? \" and \"What happened that time? \"       For the past month    6. CARDIAC HISTORY: \"Do you have any history of heart disease? \" (e.g., heart attack, angina, bypass surgery, angioplasty)       No diagnosed    7. LUNG HISTORY: \"Do you have any history of lung disease? \"  (e.g., pulmonary embolus, asthma, emphysema)      Not diagnosed    8. CAUSE: \"What do you think is causing the breathing problem? \"       Unsure    9. OTHER SYMPTOMS: \"Do you have any other symptoms? (e.g., dizziness, runny nose, cough, chest pain, fever)      Cough    10. PREGNANCY: \"Is there any chance you are pregnant? \" \"When was your last menstrual period? \"        Male    6. TRAVEL: \"Have you traveled out of the country in the last month? \" (e.g., travel history, exposures)        No    Protocols used: BREATHING DIFFICULTY-ADULT-AH

## 2021-03-05 NOTE — Clinical Note
Sheath #1: Sheath: removed. Hemostasis achieved. Upon evaluation of the common femoral artery stick using fluoroscopy, the access site puncture was within the safe zone.

## 2021-03-05 NOTE — PROGRESS NOTES
1325: Verbal report received through phone from ED nurse, Leslie Ramirez on Richardson Youssef. Report included patient's Situation, Background, Assessment, and Recommendations (SBAR). Patient will be going to room 354 (COVID unit) for further assessment. Ayo Vivas, Student Nurse    7549: SBAR report were discussed with preceptor Juan Reilly RN who will take over patient's care in telemetry unit.   Ayo Vivas, Student Nurse

## 2021-03-05 NOTE — H&P
History & Physical    Patient: Yayo Neal MRN: [id number]  CSN: [id number]    YOB: 1980  Age: 46 y.o. Sex: male      DOA: 3/5/2021  Primary Care Provider:  DARIANA Ferrer      Assessment/Plan   1. Acute hypoxemic respiratory failure due to pulmonary edema, possible atypical pneumonia/ COVID 19  2. Acute LV systolic CHF EF 02-82$  3. HTN  4. Prediabetes  5. Hyperlipidemia      PLAN:  - Admit to medical service with telemetry monitoring  - COVID PCR   - diurese w IV lasix  - treat for covid & bacterial pneumonia w decadron & levaquin for now  - cardiology consultation  - continue home coreg, losartan, lipitor      Patient Active Problem List   Diagnosis Code    Suspected COVID-19 virus infection J77.815    Systolic CHF, acute ([facility] [address] I50.21    Hypertension I10    Prediabetes R73.03     HPI:   CC: \" I cant breath\"  Yayo Neal is a 46 y.o. male with past medical history significant for prediabetes and hypertension presents to the ER with worsening shortness of breath. 2 weeks ago he sought primary care w complaints of new onset dyspnea and orthopnea. He denies any chest pain, pressure, palpitations, cough, fever or chills. No sick contacts noted, myalgia, back pain. He was sent to ER and was tested for COVID on two separate occasions which were both negative. He went to Dr Enriqueta Proctor and had a 2d echo which demonstrated EF 20%. He was started on coreg, losartan & lipitor and plans were made to have outpt cardiac catheterization on Monday. He had progressively worsening dyspea and orthopnea prompting him to seek emergent care. On presentation to the ER he was afebrile, mildly tachycardic and hypertensives satting in low 90s. He was tachypneic. Lungs clear. He had EKG with out acute ST changes, negative troponin. CXR w patchy bilateral parenchymal airspace opacities.  Ddimer was elevated and CTA chest was performed which was negative for PE, had bilateral scattered groundglass opacities, possibly reflecting atypical pneumonia, trace pleural effusion on L and mediastinal/ hilar adenopathy. Labs w elevated BNP, normal kidney function. Medicine is asked to admit for further management. PMH:  HTN, hyperlipidemia    PSH:  Lumbar surgery     Social History     Tobacco Use    Smoking status: Not on file   Substance Use Topics    Alcohol use: No    Drug use: No     No family history on file. No current facility-administered medications on file prior to encounter. Current Outpatient Medications on File Prior to Encounter   Medication Sig Dispense Refill    albuterol (PROVENTIL HFA, VENTOLIN HFA, PROAIR HFA) 90 mcg/actuation inhaler Take 2 Puffs by inhalation every four (4) hours as needed for Wheezing.  atorvastatin (LIPITOR) 20 mg tablet Take 20 mg by mouth daily.  carvediloL (COREG) 3.125 mg tablet Take 3.125 mg by mouth two (2) times daily (with meals).  losartan (COZAAR) 25 mg tablet Take 25 mg by mouth daily.  budesonide-formoteroL (Symbicort) 80-4.5 mcg/actuation HFAA Take 2 Puffs by inhalation two (2) times a day. Allergies   Allergen Reactions    Codeine Other (comments)     \"I usually blackout,I feel like I am going to die. It makes me vomit. \"    Keflex [Cephalexin] Other (comments)     Rectal itching         PFH: cancer, diabetes  SOC: 2ppd smoker, no alcohol, works as  [employer]/ [address]: No fever, chills, diaphoresis, malaise, fatigue or weight gain/loss or falls  Skin: no itching or rashes  HEENT: no neck stiffness, hearing loss, tinnitus, epistaxis or sore throat  EYES: no blurry vision, double vision or photophobia  CARDIOVASCULAR: no cp, palpitations, orthopnea, pnd or LE edema  PULMONARY: +cough, - wheeze, + shortness of breath - sputum production  GI: no nausea, vomiting, diarrhea, abdominal pain, melena, hematemesis or brbpr  : no dysuria, hematuria  MUSCULOSKELETAL: no back pain, joint pain or myalgias  ENDOCRINE: no heat/cold intolerance, polyuria or polydipsia  HEME: no easy bruising or bleeding  NEURO: no unilateral weakness, numbness, tingling or seizures      Physical Exam:        Visit Vitals  BP (!) 142/90   Pulse (!) 101   Temp 97.9 °F (36.6 °C)   Resp 26   Ht 5' 6\" (1.676 m)   Wt 105.7 kg (233 lb)   SpO2 96%   BMI 37.61 kg/m²      O2 Device: Room air    Temp (24hrs), Av.9 °F (36.6 °C), Min:97.9 °F (36.6 °C), Max:97.9 °F (36.6 °C)    701 -  190  In: -   Out: 1000 [Urine:1000]   No intake/output data recorded. Body mass index is 37.61 kg/m². General:  Awake, cooperative, no distress. Head:  Normocephalic, without obvious abnormality, atraumatic. Eyes:  Conjunctivae/corneas clear, sclera anicteric, PERRL, EOMs intact. Nose: Nares normal. No drainage or sinus tenderness. Throat: Lips, mucosa, and tongue normal. .   Neck: Supple, symmetrical, trachea midline, no adenopathy. Lungs:   Clear to auscultation bilaterally, equal expansion       Heart:  Regular rate and rhythm, S1, S2 normal, no murmur, click, rub or gallop, cap refill normal      Abdomen: Soft, non-tender. Bowel sounds normal. No masses,  No organomegaly. Extremities: Extremities normal, atraumatic, no cyanosis or edema. Pulses: 2+ and symmetric all extremities. Skin: Skin color pale, texture, turgor normal. No rashes or lesions   Neurologic: CNII-XII intact. No focal motor or sensory deficit.            Laboratory Studies:    CMP:   Lab Results   Component Value Date/Time     2021 09:12 AM    K 4.3 2021 09:12 AM     2021 09:12 AM    CO2 25 2021 09:12 AM    AGAP 6 2021 09:12 AM     (H) 2021 09:12 AM    BUN 15 2021 09:12 AM    CREA 0.95 2021 09:12 AM    GFRAA >60 2021 09:12 AM    GFRNA >60 2021 09:12 AM    CA 8.3 (L) 2021 09:12 AM    MG 2.1 2021 09:12 AM    ALB 3.2 (L) 2021 09:12 AM    TP 6.6 03/05/2021 09:12 AM    GLOB 3.4 03/05/2021 09:12 AM    AGRAT 0.9 03/05/2021 09:12 AM    ALT 58 03/05/2021 09:12 AM     CBC:   Lab Results   Component Value Date/Time    WBC 8.6 03/05/2021 09:12 AM    HGB 12.2 (L) 03/05/2021 09:12 AM    HCT 37.7 03/05/2021 09:12 AM     03/05/2021 09:12 AM     All Cardiac Markers in the last 24 hours:   Lab Results   Component Value Date/Time     03/05/2021 09:12 AM    CKMB 1.7 03/05/2021 09:12 AM    CKND1 1.2 03/05/2021 09:12 AM    TROIQ 0.02 03/05/2021 09:12 AM       Imaging studies personally reviewed:  CXR:\"    Cardiomegaly.     Patchy bilateral parenchymal airspace opacities. \"    CTA chest:\"    No evidence of pulmonary embolism.     Bilateral scattered groundglass opacities, may reflect atypical pneumonia.     Trace left pleural effusion.     Mediastinal/hilar adenopathy, likely reactive.     Left axillary skin thickening and prominence of adjacent lymph nodes measuring  up to 1 cm short axis. \"      Bushra Hollis, DO  Internal Medicine/Geriatrics

## 2021-03-05 NOTE — Clinical Note
TRANSFER - OUT REPORT:     Verbal report given to: enoch boateng. Report consisted of patient's Situation, Background, Assessment and   Recommendations(SBAR). Opportunity for questions and clarification was provided. Patient transported with a Cardiac Cath Tech / Patient Care Tech. Patient transported to: CARE UNIT.

## 2021-03-05 NOTE — CONSULTS
[facility] Consult Note      Patient: Janet Gamble MRN: [id number]  SSN: [id number]    YOB: 1980  Age: 46 y.o. Sex: male        Date of Consultation: 3/5/2021   Referring Physician: Dr. Emily Noonan  Reason for Consultation: SOB    HPI:  I was asked by Dr. Emily Noonan for shortness of breath. Janet Gamble is a 53-year-old morbidly obese gentleman with history of hypertension in the past having progressively worsening of shortness of breath from last 1 month. Patient have visited ER in last couple of weeks underwent CT scan found to have atypical infiltrate and rapid Covid test was negative x2. Patient was seen by me in cardiology clinic 2 weeks ago for assessment of shortness of breath and chest pain he was supposed to have stress test and echocardiogram but at echocardiogram has shown severe reduced ejection fraction and stress test was canceled and he was supposed to have heart catheterization done on Monday. Patient came to the hospital with progressively worsening of shortness of breath with chest discomfort and unable to lie down flat with significant difficulty of breathing. In ER patient was started on Lasix for pulmonary edema as well as possible atypical pneumonia/COVID-19 infection treatment. Patient denied any significant ankle swelling. Historically may have sleep apnea as well. No past medical history on file. No past surgical history on file.   Current Facility-Administered Medications   Medication Dose Route Frequency    sodium chloride (NS) flush 5-10 mL  5-10 mL IntraVENous PRN    levoFLOXacin (LEVAQUIN) 750 mg in D5W IVPB  750 mg IntraVENous Q24H    sodium chloride (NS) flush 5-40 mL  5-40 mL IntraVENous Q8H    sodium chloride (NS) flush 5-40 mL  5-40 mL IntraVENous PRN    acetaminophen (TYLENOL) tablet 650 mg  650 mg Oral Q6H PRN    Or    acetaminophen (TYLENOL) suppository 650 mg  650 mg Rectal Q6H PRN    polyethylene glycol (MIRALAX) packet 17 g  17 g Oral DAILY PRN    promethazine (PHENERGAN) tablet 12.5 mg  12.5 mg Oral Q6H PRN    Or    ondansetron (ZOFRAN) injection 4 mg  4 mg IntraVENous Q6H PRN    enoxaparin (LOVENOX) injection 30 mg  30 mg SubCUTAneous Q12H    dexAMETHasone (DECADRON) tablet 6 mg  6 mg Oral DAILY    [START ON 3/6/2021] furosemide (LASIX) injection 40 mg  40 mg IntraVENous DAILY    carvediloL (COREG) tablet 3.125 mg  3.125 mg Oral BID WITH MEALS    [START ON 3/6/2021] losartan (COZAAR) tablet 25 mg  25 mg Oral DAILY    budesonide-formoterol (SYMBICORT) 80-4.5 mcg inhaler  2 Puff Inhalation BID RT    [START ON 3/6/2021] ascorbic acid (vitamin C) (VITAMIN C) tablet 500 mg  500 mg Oral DAILY    [START ON 3/6/2021] cholecalciferol (VITAMIN D3) (1000 Units /25 mcg) tablet 1,000 Units  1,000 Units Oral DAILY    [START ON 3/6/2021] zinc sulfate (ZINCATE) 220 (50) mg capsule 1 Cap  1 Cap Oral DAILY    insulin lispro (HUMALOG) injection   SubCUTAneous AC&HS    glucose chewable tablet 16 g  4 Tab Oral PRN    glucagon (GLUCAGEN) injection 1 mg  1 mg IntraMUSCular PRN    dextrose 10% infusion 125-250 mL  125-250 mL IntraVENous PRN       Allergies and Intolerances: Allergies   Allergen Reactions    Codeine Other (comments)     \"I usually blackout,I feel like I am going to die. It makes me vomit. \"    Keflex [Cephalexin] Other (comments)     Rectal itching       Family History:   No family history on file. Social History:   He  has no history on file for tobacco.  He  reports no history of alcohol use. Review of Systems:     Review of Systems  Gen: No fever, chills, malaise, weight loss/gain. Heent: No headache, rhinorrhea, epistaxis, ear pain, hearing loss, sinus pain, neck pain/stiffness, sore throat. Heart: + chest pain, palpitations, +RIOJAS, pnd, or orthopnea. Resp: No cough, hemoptysis, wheezing and +shortness of breath. GI: No nausea, vomiting, diarrhea, constipation, melena or hematochezia.    : No urinary obstruction, dysuria or hematuria. Derm: No rash, new skin lesion or pruritis. Musc/skeletal: no bone or joint complains. Vasc: No edema, cyanosis or claudication. Endo: No heat/cold intolerance, no polyuria,polydipsia or polyphagia. Neuro: No unilateral weakness, numbness, tingling. No seizures. Heme: No easy bruising or bleeding. Physical:   Patient Vitals for the past 6 hrs:   Pulse Resp BP SpO2   03/05/21 1300 (!) 101 26 (!) 142/90 96 %   03/05/21 1030 (!) 102 (!) 34 (!) 153/83 92 %   03/05/21 0930 100 (!) 31 (!) 145/96 94 %         Exam:   General Appearance: stable  HEENT: DAVID. HEAD: Atraumatic  NECK: No JVD, no thyroidomeglay. LUNGS: Clear bilaterally. HEART: S1+S2     ABD: Non-tender, BS Audible    EXT: No edema, and no cysnosis. VASCULAR EXAM: Pulses are intact. PSYCHIATRIC EXAM: Mood is appropriate. MUSCULOSKELETAL: Grossly no joint deformity. NEUROLOGICAL: Motor and sensory sytem intact and Cranial nerves II-XII intact. Review of Data:   LABS:   Lab Results   Component Value Date/Time    WBC 8.6 03/05/2021 09:12 AM    HGB 12.2 (L) 03/05/2021 09:12 AM    HCT 37.7 03/05/2021 09:12 AM    PLATELET 515 98/00/8718 09:12 AM     Lab Results   Component Value Date/Time    Sodium 138 03/05/2021 09:12 AM    Potassium 4.3 03/05/2021 09:12 AM    Chloride 107 03/05/2021 09:12 AM    CO2 25 03/05/2021 09:12 AM    Glucose 181 (H) 03/05/2021 09:12 AM    BUN 15 03/05/2021 09:12 AM    Creatinine 0.95 03/05/2021 09:12 AM     No results found for: CHOL, CHOLX, CHLST, CHOLV, HDL, HDLP, LDL, LDLC, DLDLP, TGLX, TRIGL, TRIGP  No components found for: GPT  No results found for: HBA1C, HGBE8, YYD8XRZL, BTS5UBXZ    RADIOLOGY:  CT Results  (Last 48 hours)               03/05/21 1108  CTA CHEST W OR W WO CONT Final result    Impression:      No evidence of pulmonary embolism. Bilateral scattered groundglass opacities, may reflect atypical pneumonia. Trace left pleural effusion.        Mediastinal/hilar adenopathy, likely reactive. Left axillary skin thickening and prominence of adjacent lymph nodes measuring   up to 1 cm short axis. Narrative:  EXAM: CTA Chest       INDICATION: Shortness of breath. Possible PE.       COMPARISON: No prior study. TECHNIQUE: Axial CT imaging from the thoracic inlet through the diaphragm with   intravenous contrast utilizing CTA study for pulmonary artery evaluation. Coronal and sagittal MIP reformations were generated at a separate workstation. One or more dose reduction techniques were used on this CT: automated exposure   control, adjustment of the mAs and/or kVp according to patient size, and   iterative reconstruction techniques. The specific techniques used on this CT   exam have been documented in the patient's electronic medical record. Digital   Imaging and Communications in Medicine (DICOM) format image data are available   to nonaffiliated external healthcare facilities or entities on a secure, media   free, reciprocally searchable basis with patient authorization for at least a   12-month period after this study. _______________       FINDINGS:       EXAM QUALITY: Overall exam quality is adequate. Pulmonary arterial enhancement   is adequate. The breath hold is satisfactory. Respiratory motion artifact is   present, limiting evaluation. PULMONARY ARTERIES: No convincing evidence of pulmonary embolism. MEDIASTINUM: Cardiomegaly. No evidence of right heart strain. Aorta is   unremarkable. No pericardial effusion. LYMPH NODES: Hilar and mediastinal adenopathy . AIRWAY: Unremarkable. LUNGS: Bilateral scattered groundglass opacities. PLEURA: Trace pleural effusion. UPPER ABDOMEN: Visualized upper abdomen is unremarkable. .       OTHER: Gynecomastia. Left axillary skin thickening and prominence of adjacent   lymph nodes measuring up to 1 cm short axis.        _______________               CXR Results  (Last 50 hours)               03/05/21 0925  XR CHEST PORT Final result    Impression:      Cardiomegaly. Patchy bilateral parenchymal airspace opacities. Narrative:  EXAM: XR CHEST PORT       CLINICAL INDICATION/HISTORY: meets SIRS criteria   -Additional: None       COMPARISON: None       TECHNIQUE: Portable frontal view of the chest       _______________       FINDINGS:       SUPPORT DEVICES: None. HEART AND MEDIASTINUM: Cardiomegaly. LUNGS AND PLEURAL SPACES: Patchy bilateral parenchymal airspace opacities. No   large effusion or pneumothorax.       _______________                   Cardiology Procedures:   Results for orders placed or performed during the hospital encounter of 03/05/21   EKG, 12 LEAD, INITIAL   Result Value Ref Range    Ventricular Rate 99 BPM    Atrial Rate 99 BPM    P-R Interval 162 ms    QRS Duration 92 ms    Q-T Interval 368 ms    QTC Calculation (Bezet) 472 ms    Calculated R Axis -12 degrees    Calculated T Axis -103 degrees    Diagnosis       Normal sinus rhythm  Incomplete right bundle branch block  Moderate voltage criteria for LVH, may be normal variant  Cannot rule out Septal infarct , age undetermined  Abnormal ECG  No previous ECGs available        Echo Results  (Last 48 hours)    None       Cardiolite (Tc-99m Sestamibi) stress test        Impression / Plan:    Patient Active Problem List   Diagnosis Code    Suspected COVID-19 virus infection J79.113    Systolic CHF, acute (HCC) I50.21    Hypertension I10    Prediabetes R73.03     Assessment and plan      Acute combined systolic and diastolic heart failure  Suspected COVID-19 virus infection  Hypertension  Prediabetic  Obesity      Plan. Continue Lasix IV  Continue treatment for suspected  If patient is COVID-19 negative and stable on Monday will consider possible cardiac catheterization. Discussed with patient.           Signed By: Carlita Varner MD     March 5, 2021

## 2021-03-05 NOTE — Clinical Note
TRANSFER - IN REPORT:     Verbal report received from: rn.     Report consisted of patient's Situation, Background, Assessment and   Recommendations(SBAR). Opportunity for questions and clarification was provided. Assessment completed upon patient's arrival to unit and care assumed. Patient transported with a Registered Nurse.

## 2021-03-05 NOTE — PROGRESS NOTES
attempted phone call to patient, there was no answer. . Chaplains remain available to provide pastoral support to patient and family as needed and requested. Rev.  Marcelino Ballard  [phone]

## 2021-03-06 LAB
ALBUMIN SERPL-MCNC: 3.2 G/DL (ref 3.4–5)
ALBUMIN/GLOB SERPL: 0.9 {RATIO} (ref 0.8–1.7)
ALP SERPL-CCNC: 135 U/L (ref 45–117)
ALT SERPL-CCNC: 55 U/L (ref 16–61)
ANION GAP SERPL CALC-SCNC: 7 MMOL/L (ref 3–18)
AST SERPL-CCNC: 28 U/L (ref 10–38)
BASOPHILS # BLD: 0 K/UL (ref 0–0.1)
BASOPHILS NFR BLD: 0 % (ref 0–2)
BILIRUB SERPL-MCNC: 0.6 MG/DL (ref 0.2–1)
BUN SERPL-MCNC: 13 MG/DL (ref 7–18)
BUN/CREAT SERPL: 16 (ref 12–20)
CALCIUM SERPL-MCNC: 9 MG/DL (ref 8.5–10.1)
CHLORIDE SERPL-SCNC: 107 MMOL/L (ref 100–111)
CO2 SERPL-SCNC: 24 MMOL/L (ref 21–32)
CREAT SERPL-MCNC: 0.79 MG/DL (ref 0.6–1.3)
DIFFERENTIAL METHOD BLD: ABNORMAL
EOSINOPHIL # BLD: 0 K/UL (ref 0–0.4)
EOSINOPHIL NFR BLD: 0 % (ref 0–5)
ERYTHROCYTE [DISTWIDTH] IN BLOOD BY AUTOMATED COUNT: 13.8 % (ref 11.6–14.5)
FERRITIN SERPL-MCNC: 213 NG/ML (ref 8–388)
GLOBULIN SER CALC-MCNC: 3.7 G/DL (ref 2–4)
GLUCOSE BLD STRIP.AUTO-MCNC: 122 MG/DL (ref 70–110)
GLUCOSE BLD STRIP.AUTO-MCNC: 125 MG/DL (ref 70–110)
GLUCOSE SERPL-MCNC: 121 MG/DL (ref 74–99)
HCT VFR BLD AUTO: 39.3 % (ref 36–48)
HGB BLD-MCNC: 13.3 G/DL (ref 13–16)
LDH SERPL L TO P-CCNC: 269 U/L (ref 81–234)
LYMPHOCYTES # BLD: 1.2 K/UL (ref 0.9–3.6)
LYMPHOCYTES NFR BLD: 10 % (ref 21–52)
MCH RBC QN AUTO: 30.4 PG (ref 24–34)
MCHC RBC AUTO-ENTMCNC: 33.8 G/DL (ref 31–37)
MCV RBC AUTO: 89.7 FL (ref 74–97)
MONOCYTES # BLD: 0.6 K/UL (ref 0.05–1.2)
MONOCYTES NFR BLD: 5 % (ref 3–10)
NEUTS SEG # BLD: 10.3 K/UL (ref 1.8–8)
NEUTS SEG NFR BLD: 85 % (ref 40–73)
PLATELET # BLD AUTO: 279 K/UL (ref 135–420)
PMV BLD AUTO: 12.5 FL (ref 9.2–11.8)
POTASSIUM SERPL-SCNC: 4.4 MMOL/L (ref 3.5–5.5)
PROT SERPL-MCNC: 6.9 G/DL (ref 6.4–8.2)
RBC # BLD AUTO: 4.38 M/UL (ref 4.7–5.5)
SARS-COV-2, COV2NT: NOT DETECTED
SODIUM SERPL-SCNC: 138 MMOL/L (ref 136–145)
WBC # BLD AUTO: 12 K/UL (ref 4.6–13.2)

## 2021-03-06 PROCEDURE — 80053 COMPREHEN METABOLIC PANEL: CPT

## 2021-03-06 PROCEDURE — 74011250636 HC RX REV CODE- 250/636: Performed by: INTERNAL MEDICINE

## 2021-03-06 PROCEDURE — 85025 COMPLETE CBC W/AUTO DIFF WBC: CPT

## 2021-03-06 PROCEDURE — 83615 LACTATE (LD) (LDH) ENZYME: CPT

## 2021-03-06 PROCEDURE — 82728 ASSAY OF FERRITIN: CPT

## 2021-03-06 PROCEDURE — 65660000000 HC RM CCU STEPDOWN

## 2021-03-06 PROCEDURE — 36415 COLL VENOUS BLD VENIPUNCTURE: CPT

## 2021-03-06 PROCEDURE — 74011250636 HC RX REV CODE- 250/636: Performed by: EMERGENCY MEDICINE

## 2021-03-06 PROCEDURE — 82962 GLUCOSE BLOOD TEST: CPT

## 2021-03-06 PROCEDURE — 74011250637 HC RX REV CODE- 250/637: Performed by: FAMILY MEDICINE

## 2021-03-06 PROCEDURE — 74011250637 HC RX REV CODE- 250/637: Performed by: INTERNAL MEDICINE

## 2021-03-06 RX ORDER — LOSARTAN POTASSIUM 25 MG/1
25 TABLET ORAL DAILY
Status: DISCONTINUED | OUTPATIENT
Start: 2021-03-07 | End: 2021-03-07

## 2021-03-06 RX ORDER — CARVEDILOL 3.12 MG/1
6.25 TABLET ORAL 2 TIMES DAILY WITH MEALS
Status: DISCONTINUED | OUTPATIENT
Start: 2021-03-06 | End: 2021-03-07

## 2021-03-06 RX ORDER — IBUPROFEN 200 MG
1 TABLET ORAL DAILY
Status: DISCONTINUED | OUTPATIENT
Start: 2021-03-06 | End: 2021-03-09 | Stop reason: HOSPADM

## 2021-03-06 RX ADMIN — CARVEDILOL 3.12 MG: 3.12 TABLET, FILM COATED ORAL at 08:52

## 2021-03-06 RX ADMIN — ENOXAPARIN SODIUM 30 MG: 30 INJECTION SUBCUTANEOUS at 03:06

## 2021-03-06 RX ADMIN — DEXAMETHASONE 6 MG: 4 TABLET ORAL at 08:52

## 2021-03-06 RX ADMIN — BUDESONIDE AND FORMOTEROL FUMARATE DIHYDRATE 2 PUFF: 80; 4.5 AEROSOL RESPIRATORY (INHALATION) at 08:54

## 2021-03-06 RX ADMIN — ZINC SULFATE 220 MG (50 MG) CAPSULE 1 CAPSULE: CAPSULE at 08:51

## 2021-03-06 RX ADMIN — Medication 1000 UNITS: at 08:51

## 2021-03-06 RX ADMIN — FUROSEMIDE 40 MG: 10 INJECTION, SOLUTION INTRAMUSCULAR; INTRAVENOUS at 08:51

## 2021-03-06 RX ADMIN — ENOXAPARIN SODIUM 30 MG: 30 INJECTION SUBCUTANEOUS at 13:31

## 2021-03-06 RX ADMIN — CARVEDILOL 6.25 MG: 3.12 TABLET, FILM COATED ORAL at 16:40

## 2021-03-06 RX ADMIN — LEVOFLOXACIN 750 MG: 5 INJECTION, SOLUTION INTRAVENOUS at 13:31

## 2021-03-06 RX ADMIN — LOSARTAN POTASSIUM 25 MG: 25 TABLET, FILM COATED ORAL at 08:52

## 2021-03-06 RX ADMIN — Medication 500 MG: at 08:51

## 2021-03-06 RX ADMIN — Medication 10 ML: at 16:43

## 2021-03-06 NOTE — PROGRESS NOTES
Cardiology Progress Note        Patient: Blanche Bliss        Sex: male          DOA: 3/5/2021  YOB: 1980      Age:  46 y.o.        LOS:  LOS: 1 day   Assessment/Plan     Active Problems:    Suspected COVID-19 virus infection (5/2/3168)      Systolic CHF, acute ([address] (3/5/2021)      Hypertension (3/5/2021)      Prediabetes (3/5/2021)        Plan:  Feeling better  Cardiac telemetry reviewed and stable  Coreg and losartan increased  If patient negative for Covid then will proceed with cardiac catheterization on Monday  Discussed with patient and also with Dr. Nathaniel Pate. Subjective:    cc:  SOB        REVIEW OF SYSTEMS:     General: No fevers or chills. Cardiovascular: No chest pain or pressure. No palpitations. No ankle swelling  Pulmonary: No SOB, orthopnea, PND  Gastrointestinal: No nausea, vomiting or diarrhea      Objective:      Visit Vitals  BP (!) 154/99   Pulse (!) 103   Temp 98.3 °F (36.8 °C)   Resp 18   Ht 5' 6\" (1.676 m)   Wt 105.7 kg (233 lb)   SpO2 97%   BMI 37.61 kg/m²     Body mass index is 37.61 kg/m². Physical Exam:  General Appearance: Comfortable, not using accessory muscles of respiration. NECK: No JVD, no thyroidomeglay. LUNGS: Clear bilaterally. HEART: S1+S2 audible,    ABD: Non-tender, BS Audible    EXT: No edema, and no cysnosis. VASCULAR EXAM: Pulses are intact. PSYCHIATRIC EXAM: Mood is appropriate.     Medication:  Current Facility-Administered Medications   Medication Dose Route Frequency    nicotine (NICODERM CQ) 21 mg/24 hr patch 1 Patch  1 Patch TransDERmal DAILY    carvediloL (COREG) tablet 6.25 mg  6.25 mg Oral BID WITH MEALS    [START ON 3/7/2021] losartan (COZAAR) tablet 25 mg  25 mg Oral DAILY    sodium chloride (NS) flush 5-10 mL  5-10 mL IntraVENous PRN    levoFLOXacin (LEVAQUIN) 750 mg in D5W IVPB  750 mg IntraVENous Q24H    sodium chloride (NS) flush 5-40 mL  5-40 mL IntraVENous Q8H    sodium chloride (NS) flush 5-40 mL  5-40 mL IntraVENous PRN    acetaminophen (TYLENOL) tablet 650 mg  650 mg Oral Q6H PRN    Or    acetaminophen (TYLENOL) suppository 650 mg  650 mg Rectal Q6H PRN    polyethylene glycol (MIRALAX) packet 17 g  17 g Oral DAILY PRN    promethazine (PHENERGAN) tablet 12.5 mg  12.5 mg Oral Q6H PRN    Or    ondansetron (ZOFRAN) injection 4 mg  4 mg IntraVENous Q6H PRN    enoxaparin (LOVENOX) injection 30 mg  30 mg SubCUTAneous Q12H    dexAMETHasone (DECADRON) tablet 6 mg  6 mg Oral DAILY    furosemide (LASIX) injection 40 mg  40 mg IntraVENous DAILY    budesonide-formoterol (SYMBICORT) 80-4.5 mcg inhaler  2 Puff Inhalation BID RT    ascorbic acid (vitamin C) (VITAMIN C) tablet 500 mg  500 mg Oral DAILY    cholecalciferol (VITAMIN D3) (1000 Units /25 mcg) tablet 1,000 Units  1,000 Units Oral DAILY    zinc sulfate (ZINCATE) 220 (50) mg capsule 1 Cap  1 Cap Oral DAILY    insulin lispro (HUMALOG) injection   SubCUTAneous AC&HS    glucose chewable tablet 16 g  4 Tab Oral PRN    glucagon (GLUCAGEN) injection 1 mg  1 mg IntraMUSCular PRN    dextrose 10% infusion 125-250 mL  125-250 mL IntraVENous PRN               Lab/Data Reviewed:  Procedures/imaging: see electronic medical records for all procedures/Xrays   and details which were not copied into this note but were reviewed prior to creation of Plan       All lab results for the last 24 hours reviewed. Recent Labs     03/06/21  0455 03/05/21  0912   WBC 12.0 8.6   HGB 13.3 12.2*   HCT 39.3 37.7    261     Recent Labs     03/06/21  0455 03/05/21  0912    138   K 4.4 4.3    107   CO2 24 25   * 181*   BUN 13 15   CREA 0.79 0.95   CA 9.0 8.3*       RADIOLOGY:  CT Results  (Last 48 hours)               03/05/21 1108  CTA CHEST W OR W WO CONT Final result    Impression:      No evidence of pulmonary embolism. Bilateral scattered groundglass opacities, may reflect atypical pneumonia.        Trace left pleural effusion. Mediastinal/hilar adenopathy, likely reactive. Left axillary skin thickening and prominence of adjacent lymph nodes measuring   up to 1 cm short axis. Narrative:  EXAM: CTA Chest       INDICATION: Shortness of breath. Possible PE.       COMPARISON: No prior study. TECHNIQUE: Axial CT imaging from the thoracic inlet through the diaphragm with   intravenous contrast utilizing CTA study for pulmonary artery evaluation. Coronal and sagittal MIP reformations were generated at a separate workstation. One or more dose reduction techniques were used on this CT: automated exposure   control, adjustment of the mAs and/or kVp according to patient size, and   iterative reconstruction techniques. The specific techniques used on this CT   exam have been documented in the patient's electronic medical record. Digital   Imaging and Communications in Medicine (DICOM) format image data are available   to nonaffiliated external healthcare facilities or entities on a secure, media   free, reciprocally searchable basis with patient authorization for at least a   12-month period after this study. _______________       FINDINGS:       EXAM QUALITY: Overall exam quality is adequate. Pulmonary arterial enhancement   is adequate. The breath hold is satisfactory. Respiratory motion artifact is   present, limiting evaluation. PULMONARY ARTERIES: No convincing evidence of pulmonary embolism. MEDIASTINUM: Cardiomegaly. No evidence of right heart strain. Aorta is   unremarkable. No pericardial effusion. LYMPH NODES: Hilar and mediastinal adenopathy . AIRWAY: Unremarkable. LUNGS: Bilateral scattered groundglass opacities. PLEURA: Trace pleural effusion. UPPER ABDOMEN: Visualized upper abdomen is unremarkable. .       OTHER: Gynecomastia. Left axillary skin thickening and prominence of adjacent   lymph nodes measuring up to 1 cm short axis. _______________               CXR Results  (Last 48 hours)               03/05/21 0925  XR CHEST PORT Final result    Impression:      Cardiomegaly. Patchy bilateral parenchymal airspace opacities. Narrative:  EXAM: XR CHEST PORT       CLINICAL INDICATION/HISTORY: meets SIRS criteria   -Additional: None       COMPARISON: None       TECHNIQUE: Portable frontal view of the chest       _______________       FINDINGS:       SUPPORT DEVICES: None. HEART AND MEDIASTINUM: Cardiomegaly. LUNGS AND PLEURAL SPACES: Patchy bilateral parenchymal airspace opacities.  No   large effusion or pneumothorax.       _______________                   Cardiology Procedures:   Results for orders placed or performed during the hospital encounter of 03/05/21   EKG, 12 LEAD, INITIAL   Result Value Ref Range    Ventricular Rate 99 BPM    Atrial Rate 99 BPM    P-R Interval 162 ms    QRS Duration 92 ms    Q-T Interval 368 ms    QTC Calculation (Bezet) 472 ms    Calculated R Axis -12 degrees    Calculated T Axis -103 degrees    Diagnosis       Normal sinus rhythm  Incomplete right bundle branch block  Moderate voltage criteria for LVH, may be normal variant  Cannot rule out Septal infarct , age undetermined  Abnormal ECG  No previous ECGs available  Confirmed by Kitty Drake MD. ([id number]) on 3/5/2021 9:34:32 PM        Echo Results  (Last 48 hours)    None       Cardiolite (Tc-99m Sestamibi) stress test    Signed By: Abe Sainz MD     March 6, 2021

## 2021-03-06 NOTE — PROGRESS NOTES
[id number] Shift Summary: Pt rested well overnight with no complaints. No new clinical concerns noted.      Nightshift Chart Audit Completed

## 2021-03-06 NOTE — PROGRESS NOTES
0710 Bedside and Verbal shift change report given to INA Berumen RN (oncoming nurse) by REBEL Rowan RN (offgoing nurse). Report included the following information SBAR, Kardex, Intake/Output, and MAR.     0850 Pt assessed. No signs of acute distress. Pt in bed.    1330 Pt assessed. No signs of acute distress. Pt in bed.    1639 Pt assessed. No signs of acute distress. Pt in bed.    1728 Symbicort d/c. Pt states he does not take the medication. 1800 Pt's PCR covid resulted negative. MD Jeronimo Buchanan notified. Pt moved to 357. Vitamins and isolation d/c    1930 Bedside and Verbal shift change report given to Cheryl Vasquez RN (oncoming nurse) by George Alston. María Berumen RN (offgoing nurse). Report included the following information SBAR, Kardex, Intake/Output, and MAR. Pt in bed, call light in reach.

## 2021-03-06 NOTE — ROUTINE PROCESS
Bedside and Verbal shift change report given to Demarcus Gonzalez RN  (oncoming nurse) by Jose Arce RN  (offgoing nurse). Report given with SBAR, Kardex, Intake/Output and Recent Results.

## 2021-03-06 NOTE — PROGRESS NOTES
Problem: Falls - Risk of  Goal: *Absence of Falls  Description: Document Stacey Vergara Fall Risk and appropriate interventions in the flowsheet.   Outcome: Progressing Towards Goal  Note: Fall Risk Interventions:            Medication Interventions: Patient to call before getting OOB, Teach patient to arise slowly

## 2021-03-06 NOTE — PROGRESS NOTES
Hospitalist Progress Note    Patient: Sulaiman Lao MRN: [id number]  CSN: [id number]    YOB: 1980  Age: 46 y.o. Sex: male    DOA: 3/5/2021 LOS:  LOS: 1 day            Assessment/Plan   1. Acute hypoxemic respiratory failure due to pulmonary edema, possible atypical pneumonia/ COVID 19  2. Acute LV systolic CHF EF 64-31%- improved  3. HTN- uncontrolled  4. Prediabetes  5. Hyperlipidemia    Plan:  - continue decadron, levaquin & diuresis  - increase coreg & losartan   - for cardiac cath   - COVID pending      Patient Active Problem List   Diagnosis Code    Suspected COVID-19 virus infection G96.717    Systolic CHF, acute (HCC) I50.21    Hypertension I10    Prediabetes R73.03               Subjective:    cc: \" I feel so much better\"  No acute events overnight  Reports improvement in dyspnea      REVIEW OF SYSTEMS:  General: No fevers or chills. Cardiovascular: No chest pain or pressure. No palpitations. Pulmonary: No shortness of breath. Gastrointestinal: No nausea, vomiting. Objective:        Vital signs/Intake and Output:  Visit Vitals  BP (!) 154/99   Pulse (!) 103   Temp 98.3 °F (36.8 °C)   Resp 18   Ht 5' 6\" (1.676 m)   Wt 105.7 kg (233 lb)   SpO2 97%   BMI 37.61 kg/m²     Current Shift:  No intake/output data recorded. Last three shifts:  03/04 1901 - 03/06 0700  In: 150 [I.V.:150]  Out: 1000 [Urine:1000]    Body mass index is 37.61 kg/m².     Physical Exam:  GEN: Alert and oriented times three NAD  EYES: conjunctiva normal, lids with out lesions  HEENT: MMM, No thyromegaly, no lymphadenopathy  HEART: RRR +S1 +S2, no JVD, pulses 2+ distally  LUNGS: CTA B/L no wheezes, rales or rhonchi  ABDOMEN: + BS, soft NT/ND no organomegaly,  No rebound  EXTREMITIES: No edema cyanosis, cap refill normal   SKIN: no rashes or skin breakdown, no nodules, normal turgor  Current Facility-Administered Medications   Medication Dose Route Frequency    nicotine (NICODERM CQ) 21 mg/24 hr patch 1 Patch  1 Patch TransDERmal DAILY    carvediloL (COREG) tablet 6.25 mg  6.25 mg Oral BID WITH MEALS    [START ON 3/7/2021] losartan (COZAAR) tablet 25 mg  25 mg Oral DAILY    sodium chloride (NS) flush 5-10 mL  5-10 mL IntraVENous PRN    levoFLOXacin (LEVAQUIN) 750 mg in D5W IVPB  750 mg IntraVENous Q24H    sodium chloride (NS) flush 5-40 mL  5-40 mL IntraVENous Q8H    sodium chloride (NS) flush 5-40 mL  5-40 mL IntraVENous PRN    acetaminophen (TYLENOL) tablet 650 mg  650 mg Oral Q6H PRN    Or    acetaminophen (TYLENOL) suppository 650 mg  650 mg Rectal Q6H PRN    polyethylene glycol (MIRALAX) packet 17 g  17 g Oral DAILY PRN    promethazine (PHENERGAN) tablet 12.5 mg  12.5 mg Oral Q6H PRN    Or    ondansetron (ZOFRAN) injection 4 mg  4 mg IntraVENous Q6H PRN    enoxaparin (LOVENOX) injection 30 mg  30 mg SubCUTAneous Q12H    dexAMETHasone (DECADRON) tablet 6 mg  6 mg Oral DAILY    furosemide (LASIX) injection 40 mg  40 mg IntraVENous DAILY    budesonide-formoterol (SYMBICORT) 80-4.5 mcg inhaler  2 Puff Inhalation BID RT    ascorbic acid (vitamin C) (VITAMIN C) tablet 500 mg  500 mg Oral DAILY    cholecalciferol (VITAMIN D3) (1000 Units /25 mcg) tablet 1,000 Units  1,000 Units Oral DAILY    zinc sulfate (ZINCATE) 220 (50) mg capsule 1 Cap  1 Cap Oral DAILY    insulin lispro (HUMALOG) injection   SubCUTAneous AC&HS    glucose chewable tablet 16 g  4 Tab Oral PRN    glucagon (GLUCAGEN) injection 1 mg  1 mg IntraMUSCular PRN    dextrose 10% infusion 125-250 mL  125-250 mL IntraVENous PRN         All the patient's labs over the past 24 hours were reviewed both during my initial daily workflow process and at the time notated as \"note time\" in [address] [facility]. (It is not time stamped separately in this workflow.)  Select labs are listed below.         Labs: Results:       Chemistry Recent Labs     03/06/21  0455 03/05/21  0912   * 181*    138   K 4.4 4.3    107 CO2 24 25   BUN 13 15   CREA 0.79 0.95   CA 9.0 8.3*   AGAP 7 6   BUCR 16 16   * 129*   TP 6.9 6.6   ALB 3.2* 3.2*   GLOB 3.7 3.4   AGRAT 0.9 0.9      CBC w/Diff Recent Labs     03/06/21  0455 03/05/21  0912   WBC 12.0 8.6   RBC 4.38* 4.17*   HGB 13.3 12.2*   HCT 39.3 37.7    261   GRANS 85* 67   LYMPH 10* 23   EOS 0 3      Cardiac Enzymes Recent Labs     03/05/21  0912      CKND1 1.2      Coagulation Recent Labs     03/05/21  0912   PTP 15.0   INR 1.2   APTT 31.5               Liver Enzymes Recent Labs     03/06/21  0455   TP 6.9   ALB 3.2*   *          Procedures/imaging: see electronic medical records for all procedures/Xrays and details which were not copied into this note but were reviewed prior to creation of [address] [place] [address] Rd, DO  Internal Medicine/Geriatrics

## 2021-03-06 NOTE — PROGRESS NOTES
Problem: Falls - Risk of  Goal: *Absence of Falls  Description: Document Clydene Bone Fall Risk and appropriate interventions in the flowsheet.   Outcome: Progressing Towards Goal  Note: Fall Risk Interventions:            Medication Interventions: Evaluate medications/consider consulting pharmacy, Patient to call before getting OOB, Teach patient to arise slowly                   Problem: Patient Education: Go to Patient Education Activity  Goal: Patient/Family Education  Outcome: Progressing Towards Goal

## 2021-03-07 PROBLEM — I42.9 CARDIOMYOPATHY (HCC): Status: ACTIVE | Noted: 2021-03-05

## 2021-03-07 PROBLEM — I50.23 SYSTOLIC CHF, ACUTE ON CHRONIC (HCC): Status: ACTIVE | Noted: 2021-03-05

## 2021-03-07 LAB
ALBUMIN SERPL-MCNC: 3.1 G/DL (ref 3.4–5)
ALBUMIN/GLOB SERPL: 0.8 {RATIO} (ref 0.8–1.7)
ALP SERPL-CCNC: 118 U/L (ref 45–117)
ALT SERPL-CCNC: 56 U/L (ref 16–61)
ANION GAP SERPL CALC-SCNC: 5 MMOL/L (ref 3–18)
AST SERPL-CCNC: 21 U/L (ref 10–38)
BASOPHILS # BLD: 0 K/UL (ref 0–0.1)
BASOPHILS NFR BLD: 0 % (ref 0–2)
BILIRUB SERPL-MCNC: 0.5 MG/DL (ref 0.2–1)
BUN SERPL-MCNC: 19 MG/DL (ref 7–18)
BUN/CREAT SERPL: 18 (ref 12–20)
CALCIUM SERPL-MCNC: 9 MG/DL (ref 8.5–10.1)
CHLORIDE SERPL-SCNC: 104 MMOL/L (ref 100–111)
CO2 SERPL-SCNC: 29 MMOL/L (ref 21–32)
CREAT SERPL-MCNC: 1.07 MG/DL (ref 0.6–1.3)
DIFFERENTIAL METHOD BLD: ABNORMAL
EOSINOPHIL # BLD: 0 K/UL (ref 0–0.4)
EOSINOPHIL NFR BLD: 0 % (ref 0–5)
ERYTHROCYTE [DISTWIDTH] IN BLOOD BY AUTOMATED COUNT: 14.2 % (ref 11.6–14.5)
GLOBULIN SER CALC-MCNC: 4.1 G/DL (ref 2–4)
GLUCOSE SERPL-MCNC: 122 MG/DL (ref 74–99)
HCT VFR BLD AUTO: 38.7 % (ref 36–48)
HGB BLD-MCNC: 12.4 G/DL (ref 13–16)
LYMPHOCYTES # BLD: 2.2 K/UL (ref 0.9–3.6)
LYMPHOCYTES NFR BLD: 13 % (ref 21–52)
MCH RBC QN AUTO: 29 PG (ref 24–34)
MCHC RBC AUTO-ENTMCNC: 32 G/DL (ref 31–37)
MCV RBC AUTO: 90.4 FL (ref 74–97)
MONOCYTES # BLD: 1.1 K/UL (ref 0.05–1.2)
MONOCYTES NFR BLD: 7 % (ref 3–10)
NEUTS SEG # BLD: 13.7 K/UL (ref 1.8–8)
NEUTS SEG NFR BLD: 80 % (ref 40–73)
PLATELET # BLD AUTO: 295 K/UL (ref 135–420)
PMV BLD AUTO: 12.2 FL (ref 9.2–11.8)
POTASSIUM SERPL-SCNC: 4.3 MMOL/L (ref 3.5–5.5)
PROT SERPL-MCNC: 7.2 G/DL (ref 6.4–8.2)
RBC # BLD AUTO: 4.28 M/UL (ref 4.7–5.5)
SODIUM SERPL-SCNC: 138 MMOL/L (ref 136–145)
WBC # BLD AUTO: 17 K/UL (ref 4.6–13.2)

## 2021-03-07 PROCEDURE — 74011250636 HC RX REV CODE- 250/636: Performed by: INTERNAL MEDICINE

## 2021-03-07 PROCEDURE — 74011250637 HC RX REV CODE- 250/637: Performed by: INTERNAL MEDICINE

## 2021-03-07 PROCEDURE — 85025 COMPLETE CBC W/AUTO DIFF WBC: CPT

## 2021-03-07 PROCEDURE — 80053 COMPREHEN METABOLIC PANEL: CPT

## 2021-03-07 PROCEDURE — 36415 COLL VENOUS BLD VENIPUNCTURE: CPT

## 2021-03-07 PROCEDURE — 74011250636 HC RX REV CODE- 250/636: Performed by: EMERGENCY MEDICINE

## 2021-03-07 PROCEDURE — 65660000000 HC RM CCU STEPDOWN

## 2021-03-07 PROCEDURE — 74011250637 HC RX REV CODE- 250/637: Performed by: FAMILY MEDICINE

## 2021-03-07 RX ORDER — ENOXAPARIN SODIUM 100 MG/ML
40 INJECTION SUBCUTANEOUS EVERY 24 HOURS
Status: DISCONTINUED | OUTPATIENT
Start: 2021-03-07 | End: 2021-03-09 | Stop reason: HOSPADM

## 2021-03-07 RX ORDER — LOSARTAN POTASSIUM 50 MG/1
50 TABLET ORAL DAILY
Status: DISCONTINUED | OUTPATIENT
Start: 2021-03-07 | End: 2021-03-09 | Stop reason: HOSPADM

## 2021-03-07 RX ORDER — FUROSEMIDE 40 MG/1
40 TABLET ORAL DAILY
Status: DISCONTINUED | OUTPATIENT
Start: 2021-03-07 | End: 2021-03-09 | Stop reason: HOSPADM

## 2021-03-07 RX ORDER — CARVEDILOL 12.5 MG/1
12.5 TABLET ORAL 2 TIMES DAILY WITH MEALS
Status: DISCONTINUED | OUTPATIENT
Start: 2021-03-07 | End: 2021-03-09 | Stop reason: HOSPADM

## 2021-03-07 RX ADMIN — ENOXAPARIN SODIUM 30 MG: 30 INJECTION SUBCUTANEOUS at 03:03

## 2021-03-07 RX ADMIN — CARVEDILOL 12.5 MG: 12.5 TABLET, FILM COATED ORAL at 08:53

## 2021-03-07 RX ADMIN — ENOXAPARIN SODIUM 40 MG: 100 INJECTION SUBCUTANEOUS at 20:02

## 2021-03-07 RX ADMIN — LEVOFLOXACIN 750 MG: 5 INJECTION, SOLUTION INTRAVENOUS at 11:46

## 2021-03-07 RX ADMIN — FUROSEMIDE 40 MG: 40 TABLET ORAL at 11:45

## 2021-03-07 RX ADMIN — LOSARTAN POTASSIUM 50 MG: 50 TABLET, FILM COATED ORAL at 08:53

## 2021-03-07 RX ADMIN — CARVEDILOL 12.5 MG: 12.5 TABLET, FILM COATED ORAL at 18:44

## 2021-03-07 NOTE — PROGRESS NOTES
0715 Bedside and Verbal shift change report given to INA Chirinos RN (oncoming nurse) by Nicolette Hammonds RN (offgoing nurse). Report included the following information SBAR, Kardex, Intake/Output, and MAR. Pt in bed, call light in reach. 0751 Pt assessed. No signs of acute distress. Pt in bed. 1147 Pt assessed. No signs of acute distress. Pt in bed.    1549 Pt assessed. No signs of acute distress. Pt in bed. 1910 Bedside and Verbal shift change report given to Nicolette Hammonds RN (oncoming nurse) by Teofilo Torres. Sascha Chirinos RN (offgoing nurse). Report included the following information SBAR, Kardex, Intake/Output, and MAR. Pt in bed, call light in reach. Shift uneventful, pt to have cardiac cath tomorrow, NPO after midnight. Pt aware.

## 2021-03-07 NOTE — PROGRESS NOTES
Problem: Falls - Risk of  Goal: *Absence of Falls  Description: Document Starleen Freeze Fall Risk and appropriate interventions in the flowsheet.   Outcome: Progressing Towards Goal  Note: Fall Risk Interventions:            Medication Interventions: Teach patient to arise slowly, Patient to call before getting OOB                   Problem: Patient Education: Go to Patient Education Activity  Goal: Patient/Family Education  Outcome: Progressing Towards Goal

## 2021-03-07 NOTE — PROGRESS NOTES
Hospitalist Progress Note    Patient: Cyndee Navarro MRN: [id number]  CSN: [id number]    YOB: 1980  Age: 46 y.o. Sex: male    DOA: 3/5/2021 LOS:  LOS: 2 days            Assessment/Plan     1. Acute hypoxemic respiratory failure due to pulmonary edema, possible atypical pneumonia/ COVID 19  2. Acute LV systolic CHF EF 03-90%- improved  3. HTN- uncontrolled  4. Prediabetes  5. Hyperlipidemia     Plan:  - continue decadron, levaquin & diuresis  - increase coreg & losartan   - for cardiac cath           Patient Active Problem List   Diagnosis Code    Suspected COVID-19 virus infection V56.346    Systolic CHF, acute (HCC) I50.21    Hypertension I10    Prediabetes R73.03               Subjective:    cc: \" I feel great\"  No acute events overnight        REVIEW OF SYSTEMS:  General: No fevers or chills. Cardiovascular: No chest pain or pressure. No palpitations. Pulmonary: No shortness of breath. Gastrointestinal: No nausea, vomiting. Objective:        Vital signs/Intake and Output:  Visit Vitals  BP (!) 124/100 (BP 1 Location: Right upper arm)   Pulse 86   Temp 97.9 °F (36.6 °C)   Resp 15   Ht 5' 6\" (1.676 m)   Wt 100.5 kg (221 lb 8 oz)   SpO2 100%   BMI 35.75 kg/m²     Current Shift:  No intake/output data recorded. Last three shifts:  No intake/output data recorded. Body mass index is 35.75 kg/m².     Physical Exam:  GEN: Alert and oriented times three NAD  EYES: conjunctiva normal, lids with out lesions  HEENT: MMM, No thyromegaly, no lymphadenopathy  HEART: RRR +S1 +S2, no JVD, pulses 2+ distally  LUNGS: CTA B/L no wheezes, rales or rhonchi  ABDOMEN: + BS, soft NT/ND no organomegaly,  No rebound  EXTREMITIES: No edema cyanosis, cap refill normal   SKIN: no rashes or skin breakdown, no nodules, normal turgor  Current Facility-Administered Medications   Medication Dose Route Frequency    losartan (COZAAR) tablet 50 mg  50 mg Oral DAILY    carvediloL (COREG) tablet 12.5 mg  12.5 mg Oral BID WITH MEALS    furosemide (LASIX) tablet 40 mg  40 mg Oral DAILY    nicotine (NICODERM CQ) 21 mg/24 hr patch 1 Patch  1 Patch TransDERmal DAILY    sodium chloride (NS) flush 5-10 mL  5-10 mL IntraVENous PRN    levoFLOXacin (LEVAQUIN) 750 mg in D5W IVPB  750 mg IntraVENous Q24H    acetaminophen (TYLENOL) tablet 650 mg  650 mg Oral Q6H PRN    Or    acetaminophen (TYLENOL) suppository 650 mg  650 mg Rectal Q6H PRN    polyethylene glycol (MIRALAX) packet 17 g  17 g Oral DAILY PRN    promethazine (PHENERGAN) tablet 12.5 mg  12.5 mg Oral Q6H PRN    Or    ondansetron (ZOFRAN) injection 4 mg  4 mg IntraVENous Q6H PRN    enoxaparin (LOVENOX) injection 30 mg  30 mg SubCUTAneous Q12H         All the patient's labs over the past 24 hours were reviewed both during my initial daily workflow process and at the time notated as \"note time\" in [facility]. (It is not time stamped separately in this workflow.)  Select labs are listed below.         Labs: Results:       Chemistry Recent Labs     03/07/21 0230 03/06/21 0455 03/05/21 0912   * 121* 181*    138 138   K 4.3 4.4 4.3    107 107   CO2 29 24 25   BUN 19* 13 15   CREA 1.07 0.79 0.95   CA 9.0 9.0 8.3*   AGAP 5 7 6   BUCR 18 16 16   * 135* 129*   TP 7.2 6.9 6.6   ALB 3.1* 3.2* 3.2*   GLOB 4.1* 3.7 3.4   AGRAT 0.8 0.9 0.9      CBC w/Diff Recent Labs     03/07/21  0230 03/06/21 0455 03/05/21 0912   WBC 17.0* 12.0 8.6   RBC 4.28* 4.38* 4.17*   HGB 12.4* 13.3 12.2*   HCT 38.7 39.3 37.7    279 261   GRANS 80* 85* 67   LYMPH 13* 10* 23   EOS 0 0 3      Cardiac Enzymes Recent Labs     03/05/21  0912      CKND1 1.2      Coagulation Recent Labs     03/05/21  0912   PTP 15.0   INR 1.2   APTT 31.5               Liver Enzymes Recent Labs     03/07/21  0230   TP 7.2   ALB 3.1*   *          Procedures/imaging: see electronic medical records for all procedures/Xrays and details which were not copied into this note but were reviewed prior to creation of Ko Prince DO  Internal Medicine/Geriatrics

## 2021-03-07 NOTE — PROGRESS NOTES
Cardiology Progress Note        Patient: Yayo Neal        Sex: male          DOA: 3/5/2021  YOB: 1980      Age:  46 y.o.        LOS:  LOS: 2 days   Assessment/Plan     Active Problems:    Suspected COVID-19 virus infection (4/0/4367)      Systolic CHF, acute ([facility] [address] (3/5/2021)      Hypertension (3/5/2021)      Prediabetes (2/0/7054)      Systolic CHF, acute on chronic ([facility] [address] (3/5/2021)      Overview: Added automatically from request for surgery [id number]      Cardiomyopathy [facility]) (3/5/2021)      Overview: Added automatically from request for surgery [id number]        Plan:        Shortness of breath  CHF  Cardiomyopathy  Hypertension    Plan. Change Lovenox to DVT prophylaxis  Right and left heart catheterization possible PCI tomorrow  Discussed in detail with patient and wife. Subjective:    cc:  Shortness of breath  CHF  Cardiomyopathy  Hypertension        REVIEW OF SYSTEMS:   Feeling better  General: No fevers or chills. Cardiovascular: No chest pain or pressure. No palpitations. No ankle swelling  Pulmonary: No SOB, orthopnea, PND  Gastrointestinal: No nausea, vomiting or diarrhea      Objective:      Visit Vitals  BP (!) 124/100 (BP 1 Location: Right upper arm)   Pulse 86   Temp 97.9 °F (36.6 °C)   Resp 15   Ht 5' 6\" (1.676 m)   Wt 100.5 kg (221 lb 8 oz)   SpO2 100%   BMI 35.75 kg/m²     Body mass index is 35.75 kg/m². Physical Exam:  General Appearance: Comfortable, not using accessory muscles of respiration. NECK: No JVD, no thyroidomeglay. LUNGS: Clear bilaterally. HEART: S1+S2 audible,    ABD: Non-tender, BS Audible    EXT: No edema, and no cysnosis. VASCULAR EXAM: Pulses are intact. PSYCHIATRIC EXAM: Mood is appropriate.     Medication:  Current Facility-Administered Medications   Medication Dose Route Frequency    losartan (COZAAR) tablet 50 mg  50 mg Oral DAILY    carvediloL (COREG) tablet 12.5 mg  12.5 mg Oral BID WITH MEALS    furosemide (LASIX) tablet 40 mg  40 mg Oral DAILY    enoxaparin (LOVENOX) injection 40 mg  40 mg SubCUTAneous Q24H    nicotine (NICODERM CQ) 21 mg/24 hr patch 1 Patch  1 Patch TransDERmal DAILY    sodium chloride (NS) flush 5-10 mL  5-10 mL IntraVENous PRN    levoFLOXacin (LEVAQUIN) 750 mg in D5W IVPB  750 mg IntraVENous Q24H    acetaminophen (TYLENOL) tablet 650 mg  650 mg Oral Q6H PRN    Or    acetaminophen (TYLENOL) suppository 650 mg  650 mg Rectal Q6H PRN    polyethylene glycol (MIRALAX) packet 17 g  17 g Oral DAILY PRN    promethazine (PHENERGAN) tablet 12.5 mg  12.5 mg Oral Q6H PRN    Or    ondansetron (ZOFRAN) injection 4 mg  4 mg IntraVENous Q6H PRN               Lab/Data Reviewed:  Procedures/imaging: see electronic medical records for all procedures/Xrays   and details which were not copied into this note but were reviewed prior to creation of Plan       All lab results for the last 24 hours reviewed.      Recent Labs     03/07/21  0230 03/06/21  0455 03/05/21  0912   WBC 17.0* 12.0 8.6   HGB 12.4* 13.3 12.2*   HCT 38.7 39.3 37.7    279 261     Recent Labs     03/07/21  0230 03/06/21  0455 03/05/21  0912    138 138   K 4.3 4.4 4.3    107 107   CO2 29 24 25   * 121* 181*   BUN 19* 13 15   CREA 1.07 0.79 0.95   CA 9.0 9.0 8.3*       RADIOLOGY:  CT Results  (Last 48 hours)    None        CXR Results  (Last 48 hours)    None            Cardiology Procedures:   Results for orders placed or performed during the hospital encounter of 03/05/21   EKG, 12 LEAD, INITIAL   Result Value Ref Range    Ventricular Rate 99 BPM    Atrial Rate 99 BPM    P-R Interval 162 ms    QRS Duration 92 ms    Q-T Interval 368 ms    QTC Calculation (Bezet) 472 ms    Calculated R Axis -12 degrees    Calculated T Axis -103 degrees    Diagnosis       Normal sinus rhythm  Incomplete right bundle branch block  Moderate voltage criteria for LVH, may be normal variant  Cannot rule out Septal infarct , age undetermined  Abnormal ECG  No previous ECGs available  Confirmed by Nazario Clay MD. ([id number]) on 3/5/2021 9:34:32 PM        Echo Results  (Last 48 hours)    None       Cardiolite (Tc-99m Sestamibi) stress test    Signed By: Richard Durant MD     March 7, 2021

## 2021-03-07 NOTE — PROGRESS NOTES
Chart reviewed as CM on call. Pt admitted to tele by hospitalist for acute respiratory failure, CHF. Plan for cardiac cath tomorrow. CM met with pt at bedside to discuss dispo. CM role explained. Pt lives with wife (who works at the hospital). Wife to drive home at discharge. Home address confirmed per face sheet, 8-10 steps to enter. Pt independent. Denies using any DME or HH. Pts PCP confirms per face sheet. No immediate dc concerns identified. CM will cont to follow for transition of care needs and will be available via hospital  on weekends. Reason for Admission:  Per H&P pt is \" is a 46 y.o. male with past medical history significant for prediabetes and hypertension presents to the ER with worsening shortness of breath. 2 weeks ago he sought primary care w complaints of new onset dyspnea and orthopnea. He denies any chest pain, pressure, palpitations, cough, fever or chills. No sick contacts noted, myalgia, back pain. He was sent to ER and was tested for COVID on two separate occasions which were both negative. He went to Dr Karilyn Pallas and had a 2d echo which demonstrated EF 20%. He was started on coreg, losartan & lipitor and plans were made to have outpt cardiac catheterization on Monday. He had progressively worsening dyspea and orthopnea prompting him to seek emergent care. \"                     RUR Score:       11%              Plan for utilizing home health:    None at this time      PCP: First and Last name:  Delores Matamoros [place]     Name of Practice:    Are you a current patient: Yes/No:    Approximate date of last visit:    Can you participate in a virtual visit with your PCP:                     Current Advanced Directive/Advance Care Plan: Full Code      Healthcare Decision Maker:   Click here to complete [address] including selection of the Healthcare Decision Maker Relationship (ie \"Primary\")                             Transition of Care Plan:       Home    Care Management Interventions  PCP Verified by CM: Yes  Mode of Transport at Discharge:  Other (see comment)(wife)  Transition of Care Consult (CM Consult): Discharge Planning  Current Support Network: Lives with Spouse  Confirm Follow Up Transport: Self  Discharge Location  Discharge Placement: Home with family assistance

## 2021-03-07 NOTE — PROGRESS NOTES
1915  Assumed care of pt   1938  Shift assessment complete  Pt resting quietly with eyes open and chest rising and falling evenly  0031  Reassessment complete  Pt resting quietly with eyes open and chest rising and falling evenly  0303  Reassessment complete  Pt resting quietly with eyes closed and chest rising and falling evenly       3 [place] Cardiac/Medical Night Shift Chart Audit    Chart Audit completed? YES          Bedside and Verbal shift change report given to Barney Coombs (oncoming nurse) by Romelia Lopez RN (offgoing nurse). Report included the following information SBAR, Kardex, ED Summary, Intake/Output, MAR, Recent Results, Med Rec Status and Cardiac Rhythm NSR.

## 2021-03-08 ENCOUNTER — APPOINTMENT (OUTPATIENT)
Dept: GENERAL RADIOLOGY | Age: 41
DRG: 286 | End: 2021-03-08
Attending: HOSPITALIST
Payer: COMMERCIAL

## 2021-03-08 PROBLEM — Z20.822 SUSPECTED COVID-19 VIRUS INFECTION: Status: RESOLVED | Noted: 2021-03-05 | Resolved: 2021-03-08

## 2021-03-08 LAB
BASOPHILS # BLD: 0 K/UL (ref 0–0.1)
BASOPHILS NFR BLD: 0 % (ref 0–2)
DIFFERENTIAL METHOD BLD: ABNORMAL
EOSINOPHIL # BLD: 0.2 K/UL (ref 0–0.4)
EOSINOPHIL NFR BLD: 2 % (ref 0–5)
ERYTHROCYTE [DISTWIDTH] IN BLOOD BY AUTOMATED COUNT: 14.1 % (ref 11.6–14.5)
HCT VFR BLD AUTO: 38.7 % (ref 36–48)
HGB BLD-MCNC: 12.5 G/DL (ref 13–16)
LYMPHOCYTES # BLD: 5.6 K/UL (ref 0.9–3.6)
LYMPHOCYTES NFR BLD: 47 % (ref 21–52)
MCH RBC QN AUTO: 29.1 PG (ref 24–34)
MCHC RBC AUTO-ENTMCNC: 32.3 G/DL (ref 31–37)
MCV RBC AUTO: 90.2 FL (ref 74–97)
MONOCYTES # BLD: 0.7 K/UL (ref 0.05–1.2)
MONOCYTES NFR BLD: 6 % (ref 3–10)
NEUTS SEG # BLD: 5.4 K/UL (ref 1.8–8)
NEUTS SEG NFR BLD: 45 % (ref 40–73)
PLATELET # BLD AUTO: 283 K/UL (ref 135–420)
PLATELET COMMENTS,PCOM: ABNORMAL
PMV BLD AUTO: 12.5 FL (ref 9.2–11.8)
RBC # BLD AUTO: 4.29 M/UL (ref 4.7–5.5)
RBC MORPH BLD: ABNORMAL
WBC # BLD AUTO: 11.9 K/UL (ref 4.6–13.2)

## 2021-03-08 PROCEDURE — 65660000000 HC RM CCU STEPDOWN

## 2021-03-08 PROCEDURE — 77030004522 HC CATH ANGI DX EXPO BSC -A: Performed by: INTERNAL MEDICINE

## 2021-03-08 PROCEDURE — 74011250637 HC RX REV CODE- 250/637: Performed by: INTERNAL MEDICINE

## 2021-03-08 PROCEDURE — B2111ZZ FLUOROSCOPY OF MULTIPLE CORONARY ARTERIES USING LOW OSMOLAR CONTRAST: ICD-10-PCS | Performed by: INTERNAL MEDICINE

## 2021-03-08 PROCEDURE — 99152 MOD SED SAME PHYS/QHP 5/>YRS: CPT | Performed by: INTERNAL MEDICINE

## 2021-03-08 PROCEDURE — 74011000250 HC RX REV CODE- 250: Performed by: INTERNAL MEDICINE

## 2021-03-08 PROCEDURE — 77030013797 HC KT TRNSDUC PRSSR EDWD -A: Performed by: INTERNAL MEDICINE

## 2021-03-08 PROCEDURE — C1769 GUIDE WIRE: HCPCS | Performed by: INTERNAL MEDICINE

## 2021-03-08 PROCEDURE — 74011250637 HC RX REV CODE- 250/637: Performed by: FAMILY MEDICINE

## 2021-03-08 PROCEDURE — 93460 R&L HRT ART/VENTRICLE ANGIO: CPT | Performed by: INTERNAL MEDICINE

## 2021-03-08 PROCEDURE — 77030008543 HC TBNG MON PRSS MRTM -A: Performed by: INTERNAL MEDICINE

## 2021-03-08 PROCEDURE — 77030004521 HC CATH ANGI DX COOK -B: Performed by: INTERNAL MEDICINE

## 2021-03-08 PROCEDURE — 77030029997 HC DEV COM RDL R BND TELE -B: Performed by: INTERNAL MEDICINE

## 2021-03-08 PROCEDURE — C1894 INTRO/SHEATH, NON-LASER: HCPCS | Performed by: INTERNAL MEDICINE

## 2021-03-08 PROCEDURE — 85025 COMPLETE CBC W/AUTO DIFF WBC: CPT

## 2021-03-08 PROCEDURE — 99153 MOD SED SAME PHYS/QHP EA: CPT | Performed by: INTERNAL MEDICINE

## 2021-03-08 PROCEDURE — 71046 X-RAY EXAM CHEST 2 VIEWS: CPT

## 2021-03-08 PROCEDURE — 4A023N8 MEASUREMENT OF CARDIAC SAMPLING AND PRESSURE, BILATERAL, PERCUTANEOUS APPROACH: ICD-10-PCS | Performed by: INTERNAL MEDICINE

## 2021-03-08 PROCEDURE — 74011250636 HC RX REV CODE- 250/636: Performed by: INTERNAL MEDICINE

## 2021-03-08 PROCEDURE — 74011000636 HC RX REV CODE- 636: Performed by: INTERNAL MEDICINE

## 2021-03-08 PROCEDURE — C1751 CATH, INF, PER/CENT/MIDLINE: HCPCS | Performed by: INTERNAL MEDICINE

## 2021-03-08 RX ORDER — MIDAZOLAM HYDROCHLORIDE 1 MG/ML
INJECTION, SOLUTION INTRAMUSCULAR; INTRAVENOUS AS NEEDED
Status: DISCONTINUED | OUTPATIENT
Start: 2021-03-08 | End: 2021-03-08 | Stop reason: HOSPADM

## 2021-03-08 RX ORDER — GUAIFENESIN 100 MG/5ML
81 LIQUID (ML) ORAL
Status: COMPLETED | OUTPATIENT
Start: 2021-03-08 | End: 2021-03-08

## 2021-03-08 RX ORDER — SODIUM CHLORIDE 0.9 % (FLUSH) 0.9 %
5-40 SYRINGE (ML) INJECTION EVERY 8 HOURS
Status: CANCELLED | OUTPATIENT
Start: 2021-03-08

## 2021-03-08 RX ORDER — GUAIFENESIN 100 MG/5ML
81 LIQUID (ML) ORAL ONCE
Status: DISCONTINUED | OUTPATIENT
Start: 2021-03-08 | End: 2021-03-08

## 2021-03-08 RX ORDER — VERAPAMIL HYDROCHLORIDE 2.5 MG/ML
INJECTION, SOLUTION INTRAVENOUS AS NEEDED
Status: DISCONTINUED | OUTPATIENT
Start: 2021-03-08 | End: 2021-03-08 | Stop reason: HOSPADM

## 2021-03-08 RX ORDER — LIDOCAINE HYDROCHLORIDE 10 MG/ML
INJECTION INFILTRATION; PERINEURAL AS NEEDED
Status: DISCONTINUED | OUTPATIENT
Start: 2021-03-08 | End: 2021-03-08 | Stop reason: HOSPADM

## 2021-03-08 RX ORDER — SODIUM CHLORIDE 9 MG/ML
10 INJECTION, SOLUTION INTRAVENOUS CONTINUOUS
Status: DISCONTINUED | OUTPATIENT
Start: 2021-03-08 | End: 2021-03-09 | Stop reason: HOSPADM

## 2021-03-08 RX ORDER — FENTANYL CITRATE 50 UG/ML
INJECTION, SOLUTION INTRAMUSCULAR; INTRAVENOUS AS NEEDED
Status: DISCONTINUED | OUTPATIENT
Start: 2021-03-08 | End: 2021-03-08 | Stop reason: HOSPADM

## 2021-03-08 RX ORDER — SODIUM CHLORIDE 0.9 % (FLUSH) 0.9 %
5-40 SYRINGE (ML) INJECTION AS NEEDED
Status: CANCELLED | OUTPATIENT
Start: 2021-03-08

## 2021-03-08 RX ORDER — HEPARIN SODIUM 1000 [USP'U]/ML
INJECTION, SOLUTION INTRAVENOUS; SUBCUTANEOUS AS NEEDED
Status: DISCONTINUED | OUTPATIENT
Start: 2021-03-08 | End: 2021-03-08 | Stop reason: HOSPADM

## 2021-03-08 RX ORDER — HEPARIN SODIUM 200 [USP'U]/100ML
INJECTION, SOLUTION INTRAVENOUS
Status: COMPLETED | OUTPATIENT
Start: 2021-03-08 | End: 2021-03-08

## 2021-03-08 RX ADMIN — CARVEDILOL 12.5 MG: 12.5 TABLET, FILM COATED ORAL at 16:48

## 2021-03-08 RX ADMIN — FUROSEMIDE 40 MG: 40 TABLET ORAL at 09:29

## 2021-03-08 RX ADMIN — CARVEDILOL 12.5 MG: 12.5 TABLET, FILM COATED ORAL at 09:29

## 2021-03-08 RX ADMIN — ENOXAPARIN SODIUM 40 MG: 100 INJECTION SUBCUTANEOUS at 20:07

## 2021-03-08 RX ADMIN — SODIUM CHLORIDE 10 ML/HR: 900 INJECTION, SOLUTION INTRAVENOUS at 10:24

## 2021-03-08 RX ADMIN — LOSARTAN POTASSIUM 50 MG: 50 TABLET, FILM COATED ORAL at 09:29

## 2021-03-08 RX ADMIN — ASPIRIN 81 MG: 81 TABLET, CHEWABLE ORAL at 10:39

## 2021-03-08 NOTE — PROGRESS NOTES
Cardiology Progress Note        Patient: Wanda Bess        Sex: male          DOA: 3/5/2021  YOB: 1980      Age:  46 y.o.        LOS:  LOS: 3 days   Assessment/Plan     Active Problems:    Systolic CHF, acute ([facility] [address] (3/5/2021)      Hypertension (3/5/2021)      Prediabetes (0/7/9832)      Systolic CHF, acute on chronic ([facility] [address] (3/5/2021)      Overview: Added automatically from request for surgery [id number]      Cardiomyopathy [facility]) (3/5/2021)      Overview: Added automatically from request for surgery [id number]        Plan:      SOB  HTN  CMP  CHF    Mild leucocytosis without fever may be due to recent decadron  Will proceed with cath procedure today. R/B/A discussed with patient and wife, both agree to proceed              Subjective:    cc:  SOB  HTN  CMP  CHF        REVIEW OF SYSTEMS:     General: No fevers or chills. Cardiovascular: No chest pain or pressure. No palpitations. No ankle swelling  Pulmonary: No SOB, orthopnea, PND  Gastrointestinal: No nausea, vomiting or diarrhea      Objective:      Visit Vitals  /89   Pulse 85   Temp 98.6 °F (37 °C)   Resp 17   Ht 5' 6\" (1.676 m)   Wt 101.1 kg (222 lb 14.2 oz)   SpO2 97%   BMI 35.97 kg/m²     Body mass index is 35.97 kg/m². Physical Exam:  General Appearance: Comfortable, not using accessory muscles of respiration. NECK: No JVD, no thyroidomeglay. LUNGS: Clear bilaterally. HEART: S1+S2 audible,    ABD: Non-tender, BS Audible    EXT: No edema, and no cysnosis. VASCULAR EXAM: Pulses are intact. PSYCHIATRIC EXAM: Mood is appropriate.     Medication:  Current Facility-Administered Medications   Medication Dose Route Frequency    0.9% sodium chloride infusion  10 mL/hr IntraVENous CONTINUOUS    losartan (COZAAR) tablet 50 mg  50 mg Oral DAILY    carvediloL (COREG) tablet 12.5 mg  12.5 mg Oral BID WITH MEALS    furosemide (LASIX) tablet 40 mg  40 mg Oral DAILY    enoxaparin (LOVENOX) injection 40 mg  40 mg SubCUTAneous Q24H    nicotine (NICODERM CQ) 21 mg/24 hr patch 1 Patch  1 Patch TransDERmal DAILY    sodium chloride (NS) flush 5-10 mL  5-10 mL IntraVENous PRN    levoFLOXacin (LEVAQUIN) 750 mg in D5W IVPB  750 mg IntraVENous Q24H    acetaminophen (TYLENOL) tablet 650 mg  650 mg Oral Q6H PRN    Or    acetaminophen (TYLENOL) suppository 650 mg  650 mg Rectal Q6H PRN    polyethylene glycol (MIRALAX) packet 17 g  17 g Oral DAILY PRN    promethazine (PHENERGAN) tablet 12.5 mg  12.5 mg Oral Q6H PRN    Or    ondansetron (ZOFRAN) injection 4 mg  4 mg IntraVENous Q6H PRN               Lab/Data Reviewed:  Procedures/imaging: see electronic medical records for all procedures/Xrays   and details which were not copied into this note but were reviewed prior to creation of Plan       All lab results for the last 24 hours reviewed. Recent Labs     03/07/21  0230 03/06/21  0455   WBC 17.0* 12.0   HGB 12.4* 13.3   HCT 38.7 39.3    279     Recent Labs     03/07/21  0230 03/06/21  0455    138   K 4.3 4.4    107   CO2 29 24   * 121*   BUN 19* 13   CREA 1.07 0.79   CA 9.0 9.0       RADIOLOGY:  CT Results  (Last 48 hours)    None        CXR Results  (Last 48 hours)               03/08/21 1149  XR CHEST PA LAT Final result    Impression:      Patchy interstitial opacities throughout the lungs, improved from prior study. Narrative:  EXAM: XR CHEST PA LAT       CLINICAL INDICATION/HISTORY: eval opacities versus edema   -Additional: None       COMPARISON: 3/5/2021       TECHNIQUE: PA and lateral views of the chest       _______________       FINDINGS:       HEART AND MEDIASTINUM: Cardiomegaly. LUNGS AND PLEURAL SPACES: Patchy interstitial opacities throughout the lungs,   improved from prior study. No large effusion or pneumothorax.        BONY THORAX AND SOFT TISSUES: No acute osseous abnormality       _______________                   Cardiology Procedures:   Results for orders placed or performed during the hospital encounter of 03/05/21   EKG, 12 LEAD, INITIAL   Result Value Ref Range    Ventricular Rate 99 BPM    Atrial Rate 99 BPM    P-R Interval 162 ms    QRS Duration 92 ms    Q-T Interval 368 ms    QTC Calculation (Bezet) 472 ms    Calculated R Axis -12 degrees    Calculated T Axis -103 degrees    Diagnosis       Normal sinus rhythm  Incomplete right bundle branch block  Moderate voltage criteria for LVH, may be normal variant  Cannot rule out Septal infarct , age undetermined  Abnormal ECG  No previous ECGs available  Confirmed by Jacinto Graves MD. ([id number]) on 3/5/2021 9:34:32 PM        Echo Results  (Last 48 hours)    None       Cardiolite (Tc-99m Sestamibi) stress test    Signed By: Mahin Tse MD     March 8, 2021

## 2021-03-08 NOTE — PROGRESS NOTES
Problem: Falls - Risk of  Goal: *Absence of Falls  Description: Document Saleem Dodson Fall Risk and appropriate interventions in the flowsheet.   3/7/2021 2319 by Melly Llamas  Outcome: Progressing Towards Goal  Note: Fall Risk Interventions:            Medication Interventions: Teach patient to arise slowly, Patient to call before getting OOB                3/7/2021 1950 by Melly Llamas  Outcome: Progressing Towards Goal  Note: Fall Risk Interventions:            Medication Interventions: Teach patient to arise slowly, Patient to call before getting OOB                   Problem: Patient Education: Go to Patient Education Activity  Goal: Patient/Family Education  3/7/2021 2319 by Melly Llamas  Outcome: Progressing Towards Goal  3/7/2021 1950 by Melly Llamas  Outcome: Progressing Towards Goal

## 2021-03-08 NOTE — PROGRESS NOTES
Problem: Falls - Risk of  Goal: *Absence of Falls  Description: Document Jabier Hunter Fall Risk and appropriate interventions in the flowsheet.   Outcome: Progressing Towards Goal  Note: Fall Risk Interventions:            Medication Interventions: Assess postural VS orthostatic hypotension, Teach patient to arise slowly

## 2021-03-08 NOTE — PROGRESS NOTES
Hospitalist Progress Note    Patient: Tamiko Fuentes MRN: [id number]  CSN: [id number]    YOB: 1980  Age: 46 y.o. Sex: male    DOA: 3/5/2021 LOS:  LOS: 3 days          Chief Complaint:    SOB      Assessment/Plan     1. Acute hypoxemic respiratory failure due to pulmonary edema  2. Acute LV systolic CHF EF 83-76%- new onset recently diagnosed  3. HTN- uncontrolled  4. Prediabetes  5. Hyperlipidemia  6. Possible pneumonia-no fevers or cough, SOB has resolved with diuresis, will repeat CXR, covid was negative, he has no hypoxia    Cardiac cath today  Continue coreg, asa, lasix, cozaar    Repeat CXR    Lifestyle modifications imperative-smoking cessation, diet changes and weight loss     Disposition :  Patient Active Problem List   Diagnosis Code    Systolic CHF, acute ([facility] [address] I50.21    Hypertension I10    Prediabetes Z39.08    Systolic CHF, acute on chronic (HCC) I50.23    Cardiomyopathy ([facility] [address] I42.9       Subjective:  I feel so much better  No orthopnea, no chest pain, no cough or any Love      Review of systems:    Constitutional: denies fevers, chills  Respiratory: denies SOB, cough  Cardiovascular: denies chest pain, palpitations        Vital signs/Intake and Output:  Visit Vitals  /84 (BP 1 Location: Left upper arm, BP Patient Position: At rest)   Pulse 78   Temp 98.4 °F (36.9 °C)   Resp 17   Ht 5' 6\" (1.676 m)   Wt 101.1 kg (222 lb 14.2 oz)   SpO2 99%   BMI 35.97 kg/m²     Current Shift:  No intake/output data recorded.   Last three shifts:  03/06 1901 - 03/08 0700  In: 100 [P.O.:100]  Out: 1700 [Urine:1700]    Exam:    General: Well developed, alert, NAD, OX3  CVS:Regular rate and rhythm, no M/R/G, S1/S2 heard, no thrill  Lungs:Clear to auscultation bilaterally, no wheezes, rhonchi, or rales  Abdomen: Soft, Nontender, No distention, Normal Bowel sounds, No hepatomegaly  Extremities: No C/C/E, pulses palpable 2+  Neuro:grossly normal , follows commands  Psych:appropriate Labs: Results:       Chemistry Recent Labs     03/07/21 0230 03/06/21 0455 03/05/21 0912   * 121* 181*    138 138   K 4.3 4.4 4.3    107 107   CO2 29 24 25   BUN 19* 13 15   CREA 1.07 0.79 0.95   CA 9.0 9.0 8.3*   AGAP 5 7 6   BUCR 18 16 16   * 135* 129*   TP 7.2 6.9 6.6   ALB 3.1* 3.2* 3.2*   GLOB 4.1* 3.7 3.4   AGRAT 0.8 0.9 0.9      CBC w/Diff Recent Labs     03/07/21 0230 03/06/21 0455 03/05/21 0912   WBC 17.0* 12.0 8.6   RBC 4.28* 4.38* 4.17*   HGB 12.4* 13.3 12.2*   HCT 38.7 39.3 37.7    279 261   GRANS 80* 85* 67   LYMPH 13* 10* 23   EOS 0 0 3      Cardiac Enzymes Recent Labs     03/05/21 0912      CKND1 1.2      Coagulation Recent Labs     03/05/21 0912   PTP 15.0   INR 1.2   APTT 31.5       Lipid Panel No results found for: CHOL, CHOLPOCT, CHOLX, CHLST, CHOLV, 405267, HDL, HDLP, LDL, LDLC, DLDLP, 570146, VLDLC, VLDL, TGLX, TRIGL, TRIGP, TGLPOCT, CHHD, CHHDX   BNP No results for input(s): BNPP in the last 72 hours.    Liver Enzymes Recent Labs     03/07/21 0230   TP 7.2   ALB 3.1*   *      Thyroid Studies No results found for: T4, T3U, TSH, TSHEXT     Procedures/imaging: see electronic medical records for all procedures/Xrays and details which were not copied into this note but were reviewed prior to creation of Yelena Medina MD

## 2021-03-08 NOTE — PROGRESS NOTES
1045  Pt up to bathroom to void,  Back to bed without incident  1130 to x ray via wheelchair for CXR.    1150 back from x ray,  Denies any distress

## 2021-03-08 NOTE — PROGRESS NOTES
Discharge Planning: Home with family assistance and MD follow-up    CM notes that the patient had a cardiac cath today and is progressing towards discharge. No immediate CM needs at this time, CM to follow the patient's progress and be available to assist as needed. CMS referral placed. Care Management Interventions  PCP Verified by CM: Yes  Mode of Transport at Discharge:  Other (see comment)(wife)  Transition of Care Consult (CM Consult): Discharge Planning  Current Support Network: Lives with Spouse  Confirm Follow Up Transport: Self  Discharge Location  Discharge Placement: Home with family assistance

## 2021-03-08 NOTE — PROGRESS NOTES
1530 report called to Ashley Guan on Tele unit  1550 pt transported back to room on tele unit via bed by this nurse and transport. Pt aaox3, denies any pain or distress, discharge inst for cath reviewed with pt and wife, both verbalize understanding,  Pt denies any pain or distress at time of discharge.    One on one transfer done between this nurse and Terrell Montiel

## 2021-03-08 NOTE — ROUTINE PROCESS
Cardiac Cath Lab:  Pre Procedure Chart Check Patients chart was accessed and reviewed for possible and/or scheduled procedure. Creatinine Clearance: 
Serum creatinine: 1.07 mg/dL 03/07/21 0230 Estimated creatinine clearance: 103.2 mL/min Total Contrast  Load: 
3 x estimated clearance amount=  309.6ml 
 
75% of Contrast Load: 0.75 x Total Contrast Load=    232.2ml Recent Labs 03/07/21 
0230 03/05/21 
0912 03/05/21 
4484 WBC 17.0*   < > 8.6 RBC 4.28*   < > 4.17* HCT 38.7   < > 37.7 HGB 12.4*   < > 12.2*  
   < > 261 INR  --   --  1.2 APTT  --   --  31.5 PTP  --   --  15.0    < > 138  
K 4.3   < > 4.3 BUN 19*   < > 15 CREA 1.07   < > 0.95 GFRAA >60   < > >60 GFRNA >60   < > >60  
CA 9.0   < > 8.3*  
CPK  --   --  141 CKMB  --   --  1.7 CKND1  --   --  1.2  
TROIQ  --   --  0.02  
 < > = values in this interval not displayed. BMI: Body mass index is 35.97 kg/m². ALLERGIES:  
Allergies Allergen Reactions  Codeine Other (comments) \"I usually blackout,I feel like I am going to die. It makes me vomit. \"  Keflex [Cephalexin] Other (comments) Rectal itching Lines: 
  
  
Peripheral IV 03/05/21 Right Antecubital (Active) Site Assessment Clean, dry, & intact 03/08/21 0421 Phlebitis Assessment 0 03/08/21 0421 Infiltration Assessment 0 03/08/21 0421 Dressing Status Clean, dry, & intact 03/08/21 0421 Dressing Type Transparent;Tape 03/08/21 0421 Hub Color/Line Status Green;Capped 03/08/21 0421 Action Taken Open ports on tubing capped 03/08/21 0421 Alcohol Cap Used Yes 03/08/21 0421 History: No past medical history on file. No past surgical history on file. Patient Active Problem List  
Diagnosis Code  Systolic CHF, acute (HCC) I50.21  
 Hypertension I10  
 Prediabetes G59.34  
 Systolic CHF, acute on chronic (HCC) I50.23  Cardiomyopathy ([facility] [address] I42.9

## 2021-03-08 NOTE — PROGRESS NOTES
Tr band removed from right wrist without incident ,  No active drainage noted, area covered with 2x2 and tegaderm  Sheath removed from right anticub area, hemostasis established with antonia pressure, area covered with 2x2 and tegaderm.  Neuro/vasc assessment of right arm and hand WNL

## 2021-03-08 NOTE — PROGRESS NOTES
Problem: Falls - Risk of  Goal: *Absence of Falls  Description: Document Maria Del Carmen Led Fall Risk and appropriate interventions in the flowsheet.   Outcome: Progressing Towards Goal  Note: Fall Risk Interventions:            Medication Interventions: Patient to call before getting OOB                   Problem: Patient Education: Go to Patient Education Activity  Goal: Patient/Family Education  Outcome: Progressing Towards Goal     Problem: Patient Education: Go to Patient Education Activity  Goal: Patient/Family Education  Outcome: Progressing Towards Goal     Problem: Cath Lab Procedures: Pre-Procedure  Goal: Off Pathway (Use only if patient is Off Pathway)  Outcome: Progressing Towards Goal  Goal: Activity/Safety  Outcome: Progressing Towards Goal  Goal: Diagnostic Test/Procedures  Outcome: Progressing Towards Goal  Goal: Nutrition/Diet  Outcome: Progressing Towards Goal  Goal: Discharge Planning  Outcome: Progressing Towards Goal  Goal: Medications  Outcome: Progressing Towards Goal  Goal: Respiratory  Outcome: Progressing Towards Goal  Goal: Treatments/Interventions/Procedures  Outcome: Progressing Towards Goal  Goal: Psychosocial  Outcome: Progressing Towards Goal  Goal: *Verbalize description of procedure  Outcome: Progressing Towards Goal  Goal: *Consent signed  Outcome: Progressing Towards Goal     Problem: Cath Lab Procedures: Post-Cath Day of Procedure (Initiate SCIP Measures for Post-Op Care)  Goal: Off Pathway (Use only if patient is Off Pathway)  Outcome: Progressing Towards Goal  Goal: Activity/Safety  Outcome: Progressing Towards Goal  Goal: Consults, if ordered  Outcome: Progressing Towards Goal  Goal: Diagnostic Test/Procedures  Outcome: Progressing Towards Goal  Goal: Nutrition/Diet  Outcome: Progressing Towards Goal  Goal: Discharge Planning  Outcome: Progressing Towards Goal  Goal: Medications  Outcome: Progressing Towards Goal  Goal: Respiratory  Outcome: Progressing Towards Goal  Goal: Treatments/Interventions/Procedures  Outcome: Progressing Towards Goal  Goal: Psychosocial  Outcome: Progressing Towards Goal  Goal: *Procedure site is without bleeding and signs of infection six hours post sheath removal  Outcome: Progressing Towards Goal  Goal: *Hemodynamically stable  Outcome: Progressing Towards Goal  Goal: *Optimal pain control at patient's stated goal  Outcome: Progressing Towards Goal     Problem: Cath Lab Procedures: Post-Cath Day 1  Goal: Off Pathway (Use only if patient is Off Pathway)  Outcome: Progressing Towards Goal  Goal: Activity/Safety  Outcome: Progressing Towards Goal  Goal: Diagnostic Test/Procedures  Outcome: Progressing Towards Goal  Goal: Nutrition/Diet  Outcome: Progressing Towards Goal  Goal: Discharge Planning  Outcome: Progressing Towards Goal  Goal: Medications  Outcome: Progressing Towards Goal  Goal: Respiratory  Outcome: Progressing Towards Goal  Goal: Treatments/Interventions/Procedures  Outcome: Progressing Towards Goal  Goal: Psychosocial  Outcome: Progressing Towards Goal     Problem: Cath Lab Procedures: Discharge Outcomes  Goal: *Stable cardiac rhythm  Outcome: Progressing Towards Goal  Goal: *Hemodynamically stable  Outcome: Progressing Towards Goal  Goal: *Optimal pain control at patient's stated goal  Outcome: Progressing Towards Goal  Goal: *Pulses palpable, skin color within defined limits, skin temperature warm  Outcome: Progressing Towards Goal  Goal: *Lungs clear or at baseline  Outcome: Progressing Towards Goal  Goal: *Demonstrates ability to perform prescribed activity without shortness of breath or discomfort  Outcome: Progressing Towards Goal  Goal: *Verbalizes home exercise program, activity guidelines, cardiac precautions  Outcome: Progressing Towards Goal  Goal: *Verbalizes understanding and describes prescribed diet  Outcome: Progressing Towards Goal  Goal: *Verbalizes understanding and describes medication purposes and frequencies  Outcome: Progressing Towards Goal  Goal: *Identifies cardiac risk factors  Outcome: Progressing Towards Goal  Goal: *No signs and symptoms of infection or wound complications  Outcome: Progressing Towards Goal  Goal: *Anxiety reduced or absent  Outcome: Progressing Towards Goal  Goal: *Verbalizes and demonstrates incision care  Outcome: Progressing Towards Goal  Goal: *Understands and describes signs and symptoms to report to providers(Stroke Metric)  Outcome: Progressing Towards Goal  Goal: *Describes follow-up/return visits to physicians  Outcome: Progressing Towards Goal  Goal: *Describes available resources and support systems  Outcome: Progressing Towards Goal  Goal: *Influenza immunization  Outcome: Progressing Towards Goal  Goal: *Pneumococcal immunization  Outcome: Progressing Towards Goal

## 2021-03-08 NOTE — PROGRESS NOTES
1915  Assumed care of pt   1929  Shift assessment complete  Pt in room with wife, pt resting with eyes open and chest rising and falling evenly   No c/o pain   0012  Reassessment complete  Pt resting quietly with eyes closed and chest rising and falling evenly   Fluids removed from bedside and pt made NPO  0421  Reassessment complete  Pt resting quietly with eyes closed and chest rising and falling evenly       3 [place] Cardiac/Medical Night Shift Chart Audit    Chart Audit completed? YES         Bedside and Verbal shift change report given to Zelda Ibanez RN (oncoming nurse) by Nickie Israel RN (offgoing nurse). Report included the following information SBAR, Kardex, ED Summary, Intake/Output, MAR, Recent Results, Med Rec Status and Cardiac Rhythm NSR.

## 2021-03-09 VITALS
DIASTOLIC BLOOD PRESSURE: 94 MMHG | TEMPERATURE: 97.5 F | HEART RATE: 85 BPM | OXYGEN SATURATION: 100 % | RESPIRATION RATE: 16 BRPM | HEIGHT: 66 IN | BODY MASS INDEX: 35.82 KG/M2 | SYSTOLIC BLOOD PRESSURE: 136 MMHG | WEIGHT: 222.88 LBS

## 2021-03-09 LAB
ANION GAP SERPL CALC-SCNC: 4 MMOL/L (ref 3–18)
BASOPHILS # BLD: 0 K/UL (ref 0–0.1)
BASOPHILS NFR BLD: 0 % (ref 0–2)
BUN SERPL-MCNC: 18 MG/DL (ref 7–18)
BUN/CREAT SERPL: 19 (ref 12–20)
CALCIUM SERPL-MCNC: 8.6 MG/DL (ref 8.5–10.1)
CHLORIDE SERPL-SCNC: 106 MMOL/L (ref 100–111)
CHOLEST SERPL-MCNC: 164 MG/DL
CO2 SERPL-SCNC: 28 MMOL/L (ref 21–32)
CRD SYSTOLIC BP: 129
CREAT SERPL-MCNC: 0.96 MG/DL (ref 0.6–1.3)
DIFFERENTIAL METHOD BLD: ABNORMAL
EOSINOPHIL # BLD: 0.5 K/UL (ref 0–0.4)
EOSINOPHIL NFR BLD: 4 % (ref 0–5)
ERYTHROCYTE [DISTWIDTH] IN BLOOD BY AUTOMATED COUNT: 14.1 % (ref 11.6–14.5)
GLUCOSE SERPL-MCNC: 97 MG/DL (ref 74–99)
HCT VFR BLD AUTO: 40.6 % (ref 36–48)
HDLC SERPL-MCNC: 36 MG/DL (ref 40–60)
HDLC SERPL: 4.6 {RATIO} (ref 0–5)
HGB BLD-MCNC: 12.8 G/DL (ref 13–16)
INR PPP: 1 (ref 0.8–1.2)
LDLC SERPL CALC-MCNC: 108.2 MG/DL (ref 0–100)
LIPID PROFILE,FLP: ABNORMAL
LYMPHOCYTES # BLD: 5.8 K/UL (ref 0.9–3.6)
LYMPHOCYTES NFR BLD: 51 % (ref 21–52)
MAGNESIUM SERPL-MCNC: 2.4 MG/DL (ref 1.6–2.6)
MCH RBC QN AUTO: 28.6 PG (ref 24–34)
MCHC RBC AUTO-ENTMCNC: 31.5 G/DL (ref 31–37)
MCV RBC AUTO: 90.6 FL (ref 74–97)
MONOCYTES # BLD: 0.8 K/UL (ref 0.05–1.2)
MONOCYTES NFR BLD: 7 % (ref 3–10)
NEUTS SEG # BLD: 4.4 K/UL (ref 1.8–8)
NEUTS SEG NFR BLD: 38 % (ref 40–73)
PLATELET # BLD AUTO: 272 K/UL (ref 135–420)
PMV BLD AUTO: 12.5 FL (ref 9.2–11.8)
POTASSIUM SERPL-SCNC: 4.3 MMOL/L (ref 3.5–5.5)
PROTHROMBIN TIME: 13.3 SEC (ref 11.5–15.2)
RBC # BLD AUTO: 4.48 M/UL (ref 4.7–5.5)
RBC MORPH BLD: ABNORMAL
SODIUM SERPL-SCNC: 138 MMOL/L (ref 136–145)
TRIGL SERPL-MCNC: 99 MG/DL (ref ?–150)
VLDLC SERPL CALC-MCNC: 19.8 MG/DL
WBC # BLD AUTO: 11.5 K/UL (ref 4.6–13.2)

## 2021-03-09 PROCEDURE — 80048 BASIC METABOLIC PNL TOTAL CA: CPT

## 2021-03-09 PROCEDURE — 80061 LIPID PANEL: CPT

## 2021-03-09 PROCEDURE — 85025 COMPLETE CBC W/AUTO DIFF WBC: CPT

## 2021-03-09 PROCEDURE — 36415 COLL VENOUS BLD VENIPUNCTURE: CPT

## 2021-03-09 PROCEDURE — 85610 PROTHROMBIN TIME: CPT

## 2021-03-09 PROCEDURE — 74011250637 HC RX REV CODE- 250/637: Performed by: INTERNAL MEDICINE

## 2021-03-09 PROCEDURE — 83735 ASSAY OF MAGNESIUM: CPT

## 2021-03-09 RX ORDER — FUROSEMIDE 40 MG/1
40 TABLET ORAL DAILY
Qty: 30 TAB | Refills: 1 | Status: SHIPPED | OUTPATIENT
Start: 2021-03-09 | End: 2021-03-09 | Stop reason: SDUPTHER

## 2021-03-09 RX ORDER — CARVEDILOL 12.5 MG/1
12.5 TABLET ORAL 2 TIMES DAILY WITH MEALS
Qty: 60 TAB | Refills: 1 | Status: SHIPPED | OUTPATIENT
Start: 2021-03-09 | End: 2021-03-09 | Stop reason: SDUPTHER

## 2021-03-09 RX ORDER — FUROSEMIDE 40 MG/1
40 TABLET ORAL DAILY
Qty: 30 TAB | Refills: 1 | Status: SHIPPED | OUTPATIENT
Start: 2021-03-09 | End: 2021-11-11 | Stop reason: ALTCHOICE

## 2021-03-09 RX ORDER — LOSARTAN POTASSIUM 50 MG/1
50 TABLET ORAL DAILY
Qty: 30 TAB | Refills: 1 | Status: SHIPPED | OUTPATIENT
Start: 2021-03-09 | End: 2021-11-11 | Stop reason: DRUGHIGH

## 2021-03-09 RX ORDER — CARVEDILOL 12.5 MG/1
12.5 TABLET ORAL 2 TIMES DAILY WITH MEALS
Qty: 60 TAB | Refills: 1 | Status: SHIPPED | OUTPATIENT
Start: 2021-03-09 | End: 2021-11-11 | Stop reason: DRUGHIGH

## 2021-03-09 RX ORDER — LOSARTAN POTASSIUM 50 MG/1
50 TABLET ORAL DAILY
Qty: 30 TAB | Refills: 1 | Status: SHIPPED | OUTPATIENT
Start: 2021-03-09 | End: 2021-03-09 | Stop reason: SDUPTHER

## 2021-03-09 RX ADMIN — CARVEDILOL 12.5 MG: 12.5 TABLET, FILM COATED ORAL at 07:37

## 2021-03-09 NOTE — PROGRESS NOTES
1940-Bedside verbal report received from Andree Obrien RN. Sbar, mar, labs, kardex and patient status reviewed. Assumed care of patient.

## 2021-03-09 NOTE — PROGRESS NOTES
Cardiology Progress Note        Patient: Karlee Russell        Sex: male          DOA: 3/5/2021  YOB: 1980      Age:  46 y.o.        LOS:  LOS: 4 days   Assessment/Plan     Active Problems:    Systolic CHF, acute ([facility] [address] (3/5/2021)      Hypertension (3/5/2021)      Prediabetes (1/2/5021)      Systolic CHF, acute on chronic ([facility] [address] (3/5/2021)      Overview: Added automatically from request for surgery [id number]      Cardiomyopathy [facility]) (3/5/2021)      Overview: Added automatically from request for surgery [id number]        Plan:  Cardiac telemetry stable  Right radial artery access failure stable  Discussed with patient and wife for lifestyle changes, fluid restriction and salt restriction compliance to medication and follow-up. Subjective:    cc:  Shortness of breath  CHF   nonischemic cardiomyopathy  Hypertension        REVIEW OF SYSTEMS:     General: No fevers or chills. Cardiovascular: No chest pain or pressure. No palpitations. No ankle swelling  Pulmonary: No SOB, orthopnea, PND  Gastrointestinal: No nausea, vomiting or diarrhea      Objective:      Visit Vitals  BP (!) 136/94   Pulse 85   Temp 97.5 °F (36.4 °C)   Resp 16   Ht 5' 6\" (1.676 m)   Wt 101.1 kg (222 lb 14.2 oz)   SpO2 100%   BMI 35.97 kg/m²     Body mass index is 35.97 kg/m². Physical Exam:  General Appearance: Comfortable, not using accessory muscles of respiration. NECK: No JVD, no thyroidomeglay. LUNGS: Clear bilaterally. HEART: S1+S2 audible,    ABD: Non-tender, BS Audible    EXT: No edema, and no cysnosis. VASCULAR EXAM: Pulses are intact. PSYCHIATRIC EXAM: Mood is appropriate.     Medication:  Current Facility-Administered Medications   Medication Dose Route Frequency    0.9% sodium chloride infusion  10 mL/hr IntraVENous CONTINUOUS    losartan (COZAAR) tablet 50 mg  50 mg Oral DAILY    carvediloL (COREG) tablet 12.5 mg  12.5 mg Oral BID WITH MEALS    furosemide (LASIX) tablet 40 mg  40 mg Oral DAILY    enoxaparin (LOVENOX) injection 40 mg  40 mg SubCUTAneous Q24H    nicotine (NICODERM CQ) 21 mg/24 hr patch 1 Patch  1 Patch TransDERmal DAILY    sodium chloride (NS) flush 5-10 mL  5-10 mL IntraVENous PRN    levoFLOXacin (LEVAQUIN) 750 mg in D5W IVPB  750 mg IntraVENous Q24H    acetaminophen (TYLENOL) tablet 650 mg  650 mg Oral Q6H PRN    Or    acetaminophen (TYLENOL) suppository 650 mg  650 mg Rectal Q6H PRN    polyethylene glycol (MIRALAX) packet 17 g  17 g Oral DAILY PRN    promethazine (PHENERGAN) tablet 12.5 mg  12.5 mg Oral Q6H PRN    Or    ondansetron (ZOFRAN) injection 4 mg  4 mg IntraVENous Q6H PRN               Lab/Data Reviewed:  Procedures/imaging: see electronic medical records for all procedures/Xrays   and details which were not copied into this note but were reviewed prior to creation of Plan       All lab results for the last 24 hours reviewed. Recent Labs     03/09/21  0338 03/08/21  1300 03/07/21  0230   WBC 11.5 11.9 17.0*   HGB 12.8* 12.5* 12.4*   HCT 40.6 38.7 38.7    283 295     Recent Labs     03/09/21  0338 03/07/21  0230    138   K 4.3 4.3    104   CO2 28 29   GLU 97 122*   BUN 18 19*   CREA 0.96 1.07   CA 8.6 9.0       RADIOLOGY:  CT Results  (Last 48 hours)    None        CXR Results  (Last 48 hours)               03/08/21 1149  XR CHEST PA LAT Final result    Impression:      Patchy interstitial opacities throughout the lungs, improved from prior study. Narrative:  EXAM: XR CHEST PA LAT       CLINICAL INDICATION/HISTORY: eval opacities versus edema   -Additional: None       COMPARISON: 3/5/2021       TECHNIQUE: PA and lateral views of the chest       _______________       FINDINGS:       HEART AND MEDIASTINUM: Cardiomegaly. LUNGS AND PLEURAL SPACES: Patchy interstitial opacities throughout the lungs,   improved from prior study. No large effusion or pneumothorax.        BONY THORAX AND SOFT TISSUES: No acute osseous abnormality       _______________                   Cardiology Procedures:   Results for orders placed or performed during the hospital encounter of 03/05/21   EKG, 12 LEAD, INITIAL   Result Value Ref Range    Ventricular Rate 99 BPM    Atrial Rate 99 BPM    P-R Interval 162 ms    QRS Duration 92 ms    Q-T Interval 368 ms    QTC Calculation (Bezet) 472 ms    Calculated R Axis -12 degrees    Calculated T Axis -103 degrees    Diagnosis       Normal sinus rhythm  Incomplete right bundle branch block  Moderate voltage criteria for LVH, may be normal variant  Cannot rule out Septal infarct , age undetermined  Abnormal ECG  No previous ECGs available  Confirmed by Linda Hartman MD. ([id number]) on 3/5/2021 9:34:32 PM        Echo Results  (Last 48 hours)    None       Cardiolite (Tc-99m Sestamibi) stress test    Signed By: Simran Church MD     March 9, 2021

## 2021-03-09 NOTE — DISCHARGE SUMMARY
[address]    Name:  Echo Guerrero  MR#:   [id number]  :  1980  ACCOUNT #:  [de-identified]  ADMIT DATE:  2021  DISCHARGE DATE:    PROCEDURE:  Cardiac cath. DISCHARGE DIAGNOSES:  1. Acute hypoxemia due to pulmonary edema. 2.  Acute-on-chronic systolic congestive heart failure with depressed ejection fraction of 20%-25%. 3.  Uncontrolled hypertension. 4.  Prediabetes. 5.  Hyperlipidemia. HOSPITAL CONSULTANTS:  Dr. Belkis Guadalupe, Cardiology. HOSPITAL SUMMARY:  This 74-year-old male with history of high blood pressure, came in with shortness of breath and orthopnea. There was concern about possible pneumonia. He had been short of breath for 2 weeks with increasing leg swelling. Cardiology was consulted. EF showed to be 20%. BNP was elevated. EKG had no acute ST changes. He had a negative troponin. A CTA chest was performed which was negative for PE, but has scattered ground-glass opacities. The patient was tested for COVID, a PCR test was negative. He denies any exposure to COVID. He has had no cough. Now that he is diuresed, he feels dramatically better. His orthopnea has resolved. His blood cultures are clear. He has been up and ambulatory off oxygen. White count is normal.  He received empiric antibiotics, so he does not need take anymore of. Platelet count is normal.  Lymphocyte count is normal at 51. Chemistry is within normal limits. His A1c is 5.7%. BNP was 1097 when he came in. Ferritin is 213. He is stable for release from the hospital stay after having a cardiac cath yesterday that showed minimal coronary artery disease. He will need close followup with Cardiology and his primary care physician for blood pressure monitoring and repeat echocardiogram in the soon future. Today, he is awake and alert, up and ambulatory, taking a shower, moving around in his room.   Blood pressure is 136/94, pulse 85, temp 97.5, respiratory rate 16, SaO2 is 100% on room air. Lungs and cardiac exam are within normal limits. Lower extremities, no edema. He has been cleared by Cardiology. PLAN:  Discharge home today with instructions on CHF monitoring, diet, weight monitoring, fluid restriction. His meds are Cozaar 50 mg daily, Lasix 40 mg daily, Coreg 12.5 mg twice daily, Lipitor 20 mg at night, Symbicort 2 puffs twice daily, albuterol 2 puffs every 4 hours as needed for wheezing. Follow up with his PCP in 1 week, that is  physician assistant, and Dr. Chery Reed, Cardiology in 2 weeks. He understands these instructions. He has no concerns at this point. Forty minutes on discharge time.       Mili Davalos MD      [id number]  D:  03/09/2021 9:17  T:  03/09/2021 10:14  JOB #:  [id number]

## 2021-03-09 NOTE — PROGRESS NOTES
0730: report given to this nurse from Freddy Sanchez RN    7087: MD Leeann Camarillo order noted to d/c pt  Carter Correa RN    3156: d/c instructions printed  Nereida Roldan RN    6830: d/c instructions reviewed with pt, all questions answered, transport paged, no c/o health concern at time of d/c  REBEL Sanders RN

## 2021-03-09 NOTE — PROGRESS NOTES
Problem: Falls - Risk of  Goal: *Absence of Falls  Description: Document Ravi Cease Fall Risk and appropriate interventions in the flowsheet.   Outcome: Progressing Towards Goal  Note: Fall Risk Interventions:            Medication Interventions: Evaluate medications/consider consulting pharmacy, Patient to call before getting OOB, Teach patient to arise slowly                   Problem: Patient Education: Go to Patient Education Activity  Goal: Patient/Family Education  Outcome: Progressing Towards Goal     Problem: Patient Education: Go to Patient Education Activity  Goal: Patient/Family Education  Outcome: Progressing Towards Goal     Problem: Cath Lab Procedures: Pre-Procedure  Goal: Off Pathway (Use only if patient is Off Pathway)  Outcome: Progressing Towards Goal  Goal: Activity/Safety  Outcome: Progressing Towards Goal  Goal: Diagnostic Test/Procedures  Outcome: Progressing Towards Goal  Goal: Nutrition/Diet  Outcome: Progressing Towards Goal  Goal: Discharge Planning  Outcome: Progressing Towards Goal  Goal: Medications  Outcome: Progressing Towards Goal  Goal: Respiratory  Outcome: Progressing Towards Goal  Goal: Treatments/Interventions/Procedures  Outcome: Progressing Towards Goal  Goal: Psychosocial  Outcome: Progressing Towards Goal  Goal: *Verbalize description of procedure  Outcome: Progressing Towards Goal  Goal: *Consent signed  Outcome: Progressing Towards Goal     Problem: Cath Lab Procedures: Post-Cath Day of Procedure (Initiate SCIP Measures for Post-Op Care)  Goal: Off Pathway (Use only if patient is Off Pathway)  Outcome: Progressing Towards Goal  Goal: Activity/Safety  Outcome: Progressing Towards Goal  Goal: Consults, if ordered  Outcome: Progressing Towards Goal  Goal: Diagnostic Test/Procedures  Outcome: Progressing Towards Goal  Goal: Nutrition/Diet  Outcome: Progressing Towards Goal  Goal: Discharge Planning  Outcome: Progressing Towards Goal  Goal: Medications  Outcome: Progressing Towards Goal  Goal: Respiratory  Outcome: Progressing Towards Goal  Goal: Treatments/Interventions/Procedures  Outcome: Progressing Towards Goal  Goal: Psychosocial  Outcome: Progressing Towards Goal  Goal: *Procedure site is without bleeding and signs of infection six hours post sheath removal  Outcome: Progressing Towards Goal  Goal: *Hemodynamically stable  Outcome: Progressing Towards Goal  Goal: *Optimal pain control at patient's stated goal  Outcome: Progressing Towards Goal     Problem: Cath Lab Procedures: Post-Cath Day 1  Goal: Off Pathway (Use only if patient is Off Pathway)  Outcome: Progressing Towards Goal  Goal: Activity/Safety  Outcome: Progressing Towards Goal  Goal: Diagnostic Test/Procedures  Outcome: Progressing Towards Goal  Goal: Nutrition/Diet  Outcome: Progressing Towards Goal  Goal: Discharge Planning  Outcome: Progressing Towards Goal  Goal: Medications  Outcome: Progressing Towards Goal  Goal: Respiratory  Outcome: Progressing Towards Goal  Goal: Treatments/Interventions/Procedures  Outcome: Progressing Towards Goal  Goal: Psychosocial  Outcome: Progressing Towards Goal     Problem: Cath Lab Procedures: Discharge Outcomes  Goal: *Stable cardiac rhythm  Outcome: Progressing Towards Goal  Goal: *Hemodynamically stable  Outcome: Progressing Towards Goal  Goal: *Optimal pain control at patient's stated goal  Outcome: Progressing Towards Goal  Goal: *Pulses palpable, skin color within defined limits, skin temperature warm  Outcome: Progressing Towards Goal  Goal: *Lungs clear or at baseline  Outcome: Progressing Towards Goal  Goal: *Demonstrates ability to perform prescribed activity without shortness of breath or discomfort  Outcome: Progressing Towards Goal  Goal: *Verbalizes home exercise program, activity guidelines, cardiac precautions  Outcome: Progressing Towards Goal  Goal: *Verbalizes understanding and describes prescribed diet  Outcome: Progressing Towards Goal  Goal: *Verbalizes understanding and describes medication purposes and frequencies  Outcome: Progressing Towards Goal  Goal: *Identifies cardiac risk factors  Outcome: Progressing Towards Goal  Goal: *No signs and symptoms of infection or wound complications  Outcome: Progressing Towards Goal  Goal: *Anxiety reduced or absent  Outcome: Progressing Towards Goal  Goal: *Verbalizes and demonstrates incision care  Outcome: Progressing Towards Goal  Goal: *Understands and describes signs and symptoms to report to providers(Stroke Metric)  Outcome: Progressing Towards Goal  Goal: *Describes follow-up/return visits to physicians  Outcome: Progressing Towards Goal  Goal: *Describes available resources and support systems  Outcome: Progressing Towards Goal  Goal: *Influenza immunization  Outcome: Progressing Towards Goal  Goal: *Pneumococcal immunization  Outcome: Progressing Towards Goal

## 2021-03-09 NOTE — PROGRESS NOTES
0720: Bedside and Verbal shift change report given to Osvaldo Naik (oncoming nurse) by Barbara Garcia RN (offgoing nurse). Report included the following information SBAR, Kardex, OR Summary, Intake/Output, MAR, Recent Results and Cardiac Rhythm NSR.

## 2021-03-09 NOTE — DISCHARGE INSTRUCTIONS
DISCHARGE SUMMARY from Nurse    PATIENT INSTRUCTIONS:    After general anesthesia or intravenous sedation, for 24 hours or while taking prescription Narcotics:  · Limit your activities  · Do not drive and operate hazardous machinery  · Do not make important personal or business decisions  · Do  not drink alcoholic beverages  · If you have not urinated within 8 hours after discharge, please contact your surgeon on call. Report the following to your surgeon:  · Excessive pain, swelling, redness or odor of or around the surgical area  · Temperature over 100.5  · Nausea and vomiting lasting longer than 4 hours or if unable to take medications  · Any signs of decreased circulation or nerve impairment to extremity: change in color, persistent  numbness, tingling, coldness or increase pain  · Any questions    What to do at Home:  Recommended activity: {discharge activity:95274}, ***    If you experience any of the following symptoms ***, please follow up with ***. *  Please give a list of your current medications to your Primary Care Provider. *  Please update this list whenever your medications are discontinued, doses are      changed, or new medications (including over-the-counter products) are added. *  Please carry medication information at all times in case of emergency situations. These are general instructions for a healthy lifestyle:    No smoking/ No tobacco products/ Avoid exposure to second hand smoke  Surgeon General's Warning:  Quitting smoking now greatly reduces serious risk to your health.     Obesity, smoking, and sedentary lifestyle greatly increases your risk for illness    A healthy diet, regular physical exercise & weight monitoring are important for maintaining a healthy lifestyle    You may be retaining fluid if you have a history of heart failure or if you experience any of the following symptoms:  Weight gain of 3 pounds or more overnight or 5 pounds in a week, increased swelling in our hands or feet or shortness of breath while lying flat in bed. Please call your doctor as soon as you notice any of these symptoms; do not wait until your next office visit. The discharge information has been reviewed with the {PATIENT PARENT GUARDIAN:66260}. The {PATIENT PARENT GUARDIAN:35439} verbalized understanding. Discharge medications reviewed with the {Dishcarge meds reviewed PV:62968} and appropriate educational materials and side effects teaching were provided. ___________________________________________________________________________________________________________________________________                         Cardiac Catheterization/Angiography Discharge Instructions    *Check the puncture site frequently for swelling or bleeding. If you see any bleeding, lie down and apply pressure over the area with a clean town or washcloth. Notify your doctor for any redness, swelling, drainage or oozing from the puncture site. Notify your doctor for any fever or chills. *If the  arm with the puncture becomes cold, numb or painful,go to the emergency room. *Activity should be limited for the next 24 hours. Climb stairs as little as possible and avoid any stooping, bending or strenuous activity for 24 hours. No heavy lifting (anything over 10 pounds) for five days. *Do not drive for 24 hours. *You may resume your usual diet. Drink more fluids than usual.    *Have a responsible person drive you home and stay with you for at least 24 hours after your heart catheterization/angiography. *You may remove the bandage from your Right and Arm in 24 hours. You may shower in 24 hours. No tub baths, hot tubs or swimming for one week. Do not place any lotions, creams, powders, ointments over the puncture site for one week. You may place a clean band-aid over the puncture site each day for 5 days. Change this daily.

## 2021-03-09 NOTE — PROGRESS NOTES
Discharge Planning: Home with family assistance and MD follow-up    CM spoke with the patient regarding plans for discharge today. The patient states that a family member will be available to pick him up when discharged. Patient denies any additional questions or concerns at this time. CMS referral placed. Care Management Interventions  PCP Verified by CM: Yes  Mode of Transport at Discharge:  Other (see comment)(wife)  Transition of Care Consult (CM Consult): Discharge Planning  Current Support Network: Lives with Spouse  Confirm Follow Up Transport: Self  Discharge Location  Discharge Placement: Home with family assistance

## 2021-03-10 ENCOUNTER — PATIENT OUTREACH (OUTPATIENT)
Dept: OTHER | Age: 41
End: 2021-03-10

## 2021-03-10 NOTE — ACP (ADVANCE CARE PLANNING)
Non-Provider Advance Care Planning (ACP) Note    Date of ACP Conversation: 3/10/2021  Persons included in Conversation: patient  Length of ACP Conversation in minutes: <16 minutes (Non-Billable)    Conversation requested by: Other: CM    Authorized Decision Maker (if patient is incapable of making informed decisions):    This person is:  Next of Kin by law (only applies in absence of a Healthcare Power of  or Legal Guardian)        General ACP for ALL Patients with Decision Making Capacity:    Advance Directive Conversation with Patients who have not yet planned:  Importance of advance care planning, including choosing a healthcare agent to communicate patient's healthcare decisions if patient lost the ability to make decisions, such as after a sudden illness or accident    Review of Existing Advance Directive: (Select questions covered)  N/A    Interventions Provided:  Provided ACP educational materials:  Conversation Starter Kit

## 2021-03-10 NOTE — PROGRESS NOTES
Care Transitions Initial Call    Call within 2 business days of discharge: Yes     Patient: Venson Hashimoto Patient : 1980 MRN: [id number]    Last Discharge [address]       Complaint Diagnosis Description Type Department Provider    3/5/21 Shortness of Breath; Cough Suspected COVID-19 virus infection . .. ED to Hosp-Admission (Discharged) (ADMIT) Tila Salas, ; Barb Castellanos. .. Challenges to be reviewed by the provider   Additional needs identified to be addressed with provider           *Patient declines FU appt with PCP;    none         Method of communication with provider : face to face, chart routing, staff message, phone, none    Discussed COVID-19 related testing X 2 in ER which was negative results x 2 and patient aware; Advance Care Planning:   Does patient have an Advance Directive: not on file     Inpatient Readmission Risk score: Unplanned Readmit Risk Score: 10    Was this a readmission? no   Patient stated reason for the admission:  my high blood pressure    42yo male with progressively worsening of shortness of breath, chest discomfort over last 1 month and presented on 21 to [facility] ER on 21; Recent ECHO showed severe reduced EF 20% - 25%, for heart cath on 21 but presented to ER first;     CTA Chest in ER:  Cardiomegaly, Bilateral groud glass opacities, Trace pleural effussion;   PMH:  HTN;  Sleep apnea;.     Patients top risk factors for readmission: lack of knowledge about disease, level of motivation, medication management and PCP relationship heart failure     Interventions to address risk factors:     Learning Need, Ineffective Health Management  Goal:  Demonstrates self management skills needed to prevent recurrence of heart failure through diet and lifestyle changes, DASH diet choices, Sodium restriction, Fluid restriction and medication adherence;   · \"I only and trouble breathing\";  · Admitted for orthopnea, pulmonary edema  · ER reported Increased leg swelling for 2 weeks;  · \"I love to cook, I just need to cook better for myself\"  · He is a  - potential sedentary lifestyle;  · Potential limitation with healthy food choices on road;  · Not familiar with key symptoms of heart failure   · Continue to review red flags next call;  · Understands High Blood Pressure caused his heart muscle to work less effectively;  · He is monitoring and logging his BP readings at home now  · Unable to recall the one this AM;  · Risk for Diabetes without diet changes  HgbA1c %.7%   · TO FU with PCP for close BP control - but declines FU PCP appt at this time;   · Appt was to be within one week;  · Need HF education and prevention tools; Care Transition Nurse (CTN) contacted the patient by telephone to perform post hospital discharge assessment. Verified name and  with patient as identifiers. Provided introduction to self, and explanation of the CTN role. CTN reviewed discharge instructions, medical action plan and red flags with patient who verbalized understanding. Were discharge instructions available to patient? yes. Reviewed appropriate site of care based on symptoms and resources available to patient including: When to call 911. Patient given an opportunity to ask questions and does not have any further questions or concerns at this time. Medication reconciliation was performed with patient, who verbalizes understanding of administration of home medications. Advised obtaining a 90-day supply of all daily and as-needed medications. Referral to Pharm D needed: no     Home Health/Outpatient orders at discharge: none    Durable Medical Equipment ordered at discharge: None    Covid Risk Education    Patient has following risk factors of: heart failure.  Education provided regarding infection prevention, and signs and symptoms of COVID-19 and when to seek medical attention with patient who verbalized understanding. Discussed exposure protocols and quarantine From CDC: Are you at higher risk for severe illness?  and given an opportunity for questions and concerns. The patient agrees to contact the COVID-19 hotline 697-608-0051 or PCP office for questions related to COVID-19. For more information on steps you can take to protect yourself, see CDC's How to Protect Yourself     Was patient discharged with a pulse oximeter? no Discussed and confirmed pulse oximeter discharge instructions and when to notify provider or seek emergency care. Patient/family/caregiver given information for [employer] and agrees to enroll Declines    Discussed follow-up appointments. If no appointment was previously scheduled, appointment scheduling offered: yes but patient declined PCP appointment; Declines PCP appt FU at this time; Is follow up appointment scheduled within 7 days of discharge? no   [facility] follow up appointment(s): No future appointments. Non-[facility] follow up appointment(s): N/A    Plan for follow-up call in 3-5 days based on severity of symptoms and risk factors. Plan for next call: self management- , follow up appointment-  and medication management-   CTN provided contact information for future needs.

## 2021-03-11 LAB
BACTERIA SPEC CULT: NORMAL
BACTERIA SPEC CULT: NORMAL
SERVICE CMNT-IMP: NORMAL
SERVICE CMNT-IMP: NORMAL

## 2021-03-12 NOTE — ADT AUTH CERT NOTES
Utilization Reviews 
 
  
Heart Failure - Care Day 4 (3/8/2021) by Eulalio King 
 
  
Review Status Review Entered Completed 3/11/2021 16:13  
  
Criteria Review Care Day: 4 Care Date: 3/8/2021 Level of Care: Telemetry Guideline Day 2 Level Of Care (X) Floor Clinical Status   
(X) * Hemodynamic stability 3/11/2021 16:13:43 EST by Eulalio King   
  wnl   
(X) * Mental status at baseline 3/11/2021 16:13:43 EST by Sara john ( ) * No evidence of myocardial ischemia   
( ) * Cardiac rate and rhythm acceptable ( ) * Oxygenation at baseline or improved ( ) * Pulmonary edema absent or improved Routes (X) Low-salt diet 3/11/2021 16:13:43 EST by Eulalio King   
  cardiac diet Interventions   
(X) * Pulmonary catheter absent 3/11/2021 16:13:43 EST by Sara Carbajal absent Medications (X) Diuretics 3/11/2021 16:13:43 EST by Eulalio King   
  IV lasix 40mg and IV lasix 40mg po * Milestone Additional Notes 3/8/21 VS: /84 Pulse 78 Temp 98.4 °F Resp 17 SpO2 99% r/a   
  
MEDS: aspirin 81mg po x 1,heparinized saline 2units/ml infusion continuous,  coreg 12.5mg po bid, coreg 3.125mg po bid, decadron 6mg po daily, Lasix 40mg IV daily, Lasix 40mg  po daily, IV Levaquin 750mg q24h, cozaar 25mg po daily LABS:   
WBC: 11.9  
RBC: 4.29 (L) HGB: 12.5 (L) HCT: 38.7 MCV: 90.2 MCH: 29.1 MCHC: 32.3  
RDW: 14.1 PLATELET: 087 CXR IMPRESSION:Patchy interstitial opacities throughout the lungs, improved from prior study. MEDS:   
  
  
MD Progress Note:  
  
Assessment/Plan  
   
1. Acute hypoxemic respiratory failure due to pulmonary edema 2. Acute LV systolic CHF EF 96-33%- new onset recently diagnosed 3. HTN- uncontrolled 4. Prediabetes 5. Hyperlipidemia 6.  Possible pneumonia-no fevers or cough, SOB has resolved with diuresis, will repeat CXR, covid was negative, he has no hypoxia  
   
Cardiac cath today Continue coreg, asa, lasix, cozaar  
   
Physical Exam:    
General: Well developed, alert, NAD, OX3  
CVS:Regular rate and rhythm, no M/R/G, S1/S2 heard, no thrill Lungs:Clear to auscultation bilaterally, no wheezes, rhonchi, or rales Abdomen: Soft, Nontender, No distention, Normal Bowel sounds, No hepatomegaly Extremities: No C/C/E, pulses palpable 2+ Neuro:grossly normal , follows commands Psych:appropriate Cardiology MD Progress Note:  
  
Assessment/Plan  
   
Active Problems:  
  Systolic CHF, acute ([facility] [address] (3/5/2021)    
  Hypertension (3/5/2021)    
  Prediabetes (3/5/2021)    
  Systolic CHF, acute on chronic ([facility] [address] (3/5/2021)  
   Nathen Mon: Added automatically from request for surgery [id number]  
   
  Cardiomyopathy [facility]) (3/5/2021)  
    Overview: Added automatically from request for surgery [id number]  
   
   
   
Plan:  
   
   
SOB  
HTN  
CMP  
CHF  
   
Mild leucocytosis without fever may be due to recent decadron Will proceed with cath procedure today. R/B/A discussed with patient and wife, both agree to proceed  
          
  
  
Heart Failure - Care Day 3 (3/7/2021) by Hans Joseph 
 
  
Review Status Review Entered Completed 3/11/2021 16:03  
  
Criteria Review Care Day: 3 Care Date: 3/7/2021 Level of Care: Telemetry Guideline Day 2 Level Of Care (X) Floor Clinical Status   
(X) * Hemodynamic stability 3/11/2021 16:03:08 EST by Hans Joseph   
  wn   
(X) * Mental status at baseline 3/11/2021 16:03:08 EST by Eloisa Dempsey wnl ( ) * No evidence of myocardial ischemia   
( ) * Cardiac rate and rhythm acceptable ( ) * Oxygenation at baseline or improved ( ) * Pulmonary edema absent or improved Routes (X) Low-salt diet 3/11/2021 16:03:08 EST by Hans Joseph   
  cardiac diet Interventions ( ) * Pulmonary catheter absent Medications (X) Diuretics 3/11/2021 16:03:08 EST by Hans Joseph   
  IV lasix 40mg, IV lasix 40mg po * Milestone Additional Notes 3/7/21 VS: /100 Pulse 86 Temp 97.9 °F Resp 15 SpO2 100% r/a   
  
  
LABS:   
WBC: 17.0 (H) HGB: 12.4 (L) HCT: 38.7 PLATELET: 546 Sodium: 138 Potassium: 4.3 Glucose: 122 (H) BUN: 19 (H) Creatinine: 1.07 Calcium: 9.0 Albumin: 3.1 (L) ALT: 56 AST: 21 Alk. phosphatase: 118 (H) MEDS: coreg 12.5mg po bid, coreg 3.125mg po bid, decadron 6mg po daily, Lasix 40mg IV daily, Lasix 40mg  po daily, IV Levaquin 750mg q24h, cozaar 25mg po daily MD Progress Note:   
Assessment/Plan  
  1. Acute hypoxemic respiratory failure due to pulmonary edema, possible atypical pneumonia/ COVID 19  
2. Acute LV systolic CHF EF 44-53%- improved 3. HTN- uncontrolled 4. Prediabetes 5. Hyperlipidemia  
   
Plan:  
- continue decadron, levaquin & diuresis  
- increase coreg & losartan   
- for cardiac cath   
   
MD Cardiology 3/7/21:  
  
Assessment/Plan  
   
Active Problems:  
  Suspected COVID-19 virus infection (3/5/2021)    
  Systolic CHF, acute ([address] (3/5/2021)    
  Hypertension (3/5/2021)    
  Prediabetes (3/5/2021)    
  Systolic CHF, acute on chronic ([address] (3/5/2021)  
    Overview: Added automatically from request for surgery [id number]  
   
  Cardiomyopathy [facility]) (3/5/2021)  
    Overview: Added automatically from request for surgery [id number]  
   
   
   
Plan:  
   
   
   
Shortness of breath CHF Cardiomyopathy Hypertension  
   
Plan. Change Lovenox to DVT prophylaxis Right and left heart catheterization possible PCI tomorrow Discussed in detail with patient and wife.  
          
   
Subjective:  
 cc:  
Shortness of breath CHF Cardiomyopathy Hypertension

## 2021-03-15 ENCOUNTER — PATIENT OUTREACH (OUTPATIENT)
Dept: OTHER | Age: 41
End: 2021-03-15

## 2021-03-15 NOTE — PROGRESS NOTES
Care Manager contacted the patient by telephone in follow up. Verified  and zip code with patient as identifiers. 44yo male with progressively worsening of shortness of breath, chest discomfort over last 1 month and presented on 21 to [facility] ER on 21; Recent ECHO showed severe reduced EF 20% - 25%, for heart cath on 21 but presented to ER first;  CTA Chest in ER:  Cardiomegaly, Bilateral groud glass opacities, Trace pleural effussion;     PMH:  HTN;  Sleep apnea;. Assessment of clinical changes and knowledge demonstrated since last call:   Ongoing Plan of Care:     Learning Need, Ineffective Health Management  Goal:  Demonstrates self management skills needed to prevent recurrence of heart failure through diet and lifestyle changes, DASH diet choices, Sodium restriction, Fluid restriction and medication adherence;   · \"I only had trouble breathing, that's why I went to ER\"  ? Noted also orthopnea, pulmonary edema and Increased leg swelling for 2 weeks, prior to admission;;  · \"I love to cook, I just need to cook better for myself\"  ? He is a  - potential sedentary lifestyle;  ? Salt is not a problem for me, I never add salt;  ? Will review salty items hidden in foods next call;  ? Stopped eating out on road;  · Education  ? Reviewed the KEY symptoms of heart failure, what to monitor for and when to report;  · Email sent to patient this AM with following 2 handouts to review;  o AHA Self Check Symptoms to Monitor in CHF  o Heart Failure Symptoms and Warning Signs  ?  Understands High Blood Pressure caused his heart muscle to work less effectively;  § He is monitoring and logging his BP readings at home now  § Still high, but see Cardiologist  for FU appt;  · Risk for Diabetes without diet changes  HgbA1c %.7%   · May benefit from FU on Pulmonary groundglass findings from CT scan;      Review and discussion of plan of care with patient, who has provided input to plan, verbalized understanding and agrees with current goals. Any recurrence Red Flags or continued symptoms: none    Medication Regimen Change:  none  Completed a review of medications with patient, who verbalized understanding of how and when to take medications. Barriers / Adherence with medications:  None     Upcoming Appointments:   April FU with Cardiology OV:     Patient asked questions appropriately and denied any additional needs at this time. Patient verbalized understanding of all information discussed. Patient has my name and contact information for any follow up needs or questions.      Plan next call:    Agreed to review materials and FU in another week to discuss;

## 2021-03-25 ENCOUNTER — PATIENT OUTREACH (OUTPATIENT)
Dept: OTHER | Age: 41
End: 2021-03-25

## 2021-03-25 NOTE — PROGRESS NOTES
Care Manager contacted the patient for follow up of case management outreach, no response, but able to leave a VM message with this CM's contact information, asking for return call.   Await follow up from patient;

## 2021-04-23 ENCOUNTER — PATIENT OUTREACH (OUTPATIENT)
Dept: OTHER | Age: 41
End: 2021-04-23

## 2021-04-23 NOTE — PROGRESS NOTES
Additional needs identified to be addressed with provider no  none         Method of communication with provider : none     Discussed COVID-19 related testing which was available at this time. Test results were negative. Patient informed of results, if available? yes     Covering for Edda Nevarez RN  Care Transition Nurse (CTN) contacted the patient by telephone to follow up after admission on 3/5/21. Verified name and  with patient as identifiers. Addressed changes since last contact: symptom management-Patient reports he is monitoring daily weights and BP. States following diet recommendations. Reviewed information sent by Edda Nevarez RN and patient chasity to recall basics of CHF signs and symptoms. Deneis any SOB, edema or other complications. States working and managing daily activities without complications. Discharge needs reviewed: none   Follow up appointment completed? no Was follow up appointment scheduled within 7 days of discharge? no     Advance Care Planning:   Does patient have an Advance Directive:  not addressed at this call     CTN reviewed discharge instructions, medical action plan and red flags with patient and discussed any barriers to care and/or understanding of plan of care after discharge. Discussed appropriate site of care based on symptoms and resources available to patient including: PCP, When to call 911 and When to to call PCP or return to ER. The patient agrees to contact the PCP office for questions related to their healthcare. Patients top risk factors for readmission: lack of knowledge about disease heart failure  Interventions to address risk factors: Education of patient/family/caregiver/guardian to support self-management-Advised of need for close follow up with PCP or Cardiologist and following medication and diet recommedations as directed    [address] follow up appointment(s): No future appointments.       Plan for next call: Per Edda Nevarez RN,[facility]  CTN provided contact information for future needs. Ongoing Plan of Care:      Learning Need, Ineffective Health Management  Goal:  Demonstrates self management skills needed to prevent recurrence of heart failure through diet and lifestyle changes, DASH diet choices, Sodium restriction, Fluid restriction and medication adherence;   4/23/21 Reports he has read information sent by Babs Lindo RN and was able to recall basic signs and symptoms of CHF; States monitoring weights and compliant with diet and medications  · \"I only had trouble breathing, that's why I went to ER\"  ? Noted also orthopnea, pulmonary edema and Increased leg swelling for 2 weeks, prior to admission;;  · \"I love to cook, I just need to cook better for myself\"  ? He is a  - potential sedentary lifestyle;  ? Salt is not a problem for me, I never add salt;  ? Will review salty items hidden in foods next call;  ? Stopped eating out on road;  · Education  ? Reviewed the KEY symptoms of heart failure, what to monitor for and when to report;  § Email sent to patient this AM with following 2 handouts to review;  § AHA Self Check Symptoms to Monitor in CHF  § Heart Failure Symptoms and Warning Signs  ?  Understands High Blood Pressure caused his heart muscle to work less effectively;  § He is monitoring and logging his BP readings at home now  § Still high, but see Cardiologist April 6th for FU appt;  · Risk for Diabetes without diet changes  HgbA1c %.7%   · May benefit from FU on Pulmonary groundglass findings from CT scan;

## 2021-05-17 ENCOUNTER — PATIENT OUTREACH (OUTPATIENT)
Dept: OTHER | Age: 41
End: 2021-05-17

## 2021-09-08 LAB
CHOLESTEROL, TOTAL, 804501: 157 MG/DL
GLUCOSE SERPL-MCNC: 368 MG/DL
HBA1C MFR BLD HPLC: 10.7 %
HDL CHOLESTEROL, 011820: 38 MG/DL
LDL CHOL, CALCULATED: 102 MG/DL
TRIGL SERPL-MCNC: 84 MG/DL (ref ?–150)

## 2021-09-10 ENCOUNTER — TELEPHONE (OUTPATIENT)
Dept: PHARMACY | Age: 41
End: 2021-09-10

## 2021-09-10 NOTE — LETTER
Pam 2  [address], [address]  Phone: toll free [phone] Option #3        Mr. Espinoza Joseph  [address] [place] [address] [facility] [address]          Congratulations! You have successfully enrolled in the Be Well With Diabetes program for 2021. What you receive  Beginning October 1, you will begin receiving up to $300 in waived copays for specific medications and pharmacy-related supplies purchased through our home delivery pharmacy, [facility]. A list of eligible medications and pharmacy supplies can be found at [employer] under Be Well With Diabetes. In addition, youll receive advice and help from our pharmacists, associate care management team and diabetes educators. (And, if you also participate in the Be Well program, you can earn points and Lifestyle Management or Health Management program credit, if applicable.)    What you need to do  To maintain your benefit this year and remain eligible next year, complete the following requirements:        *Can be satisfied through [employer] [facility] Screening on-site. **Requirements to enroll must be completed within 6 months of enrollment date **Pneumonia vaccine is dependent on previous immunization and your age. Remember, program requirements must be completed by deadlines shown. If not, your benefit may be terminated, and you will not be eligible to participate again until the following year. To keep you on track, well review your [employer] account and send reminders for action. (If you dont have a [address], submit documentation to [email or web address] or by fax to [phone].)    Congratulations and thank you for taking steps to improve your health and to Be Well With Diabetes. [address] [facility]  Phone: [phone] Option #3  Email: [email or web address]  Fax: [phone]

## 2021-09-10 NOTE — TELEPHONE ENCOUNTER
Incoming call from wife (who is in DM program) regarding enrollment for newly diagnosed  into DM program.      If completed by 9/25/2021 patient will be eligible for enrollment in the DM Program on 10/01/21    Emailed enrollment form using email on file. Routed to  for tracking purposes.

## 2021-09-14 ENCOUNTER — DOCUMENTATION ONLY (OUTPATIENT)
Dept: PHARMACY | Age: 41
End: 2021-09-14

## 2021-09-14 NOTE — TELEPHONE ENCOUNTER
Pharmacy Pop Care Documentation:   Patient is missing the following requirements:  DM Program Application; W4R; If completed by 9/25/21 patient will be eligible for enrollment in the DM Program on 10/01/21.

## 2021-09-15 NOTE — TELEPHONE ENCOUNTER
Received DM Program Application via fax. A1C documented in EMR from 3/05/21: 5.7%    Pharmacy Pop Care Documentation:   Patient has completed all the requirements by 9/25/21 and therefore will be enrolled in the DM Program on 10/01/21. Application received: 3/45/70  Application scanned. Letter mailed to patient.     Jennifer Lux, [address] [employer] [facility]   Department, toll free: [phone] Option #3

## 2021-10-01 ENCOUNTER — TELEPHONE (OUTPATIENT)
Dept: PHARMACY | Age: 41
End: 2021-10-01

## 2021-10-01 ENCOUNTER — DOCUMENTATION ONLY (OUTPATIENT)
Dept: PHARMACY | Age: 41
End: 2021-10-01

## 2021-10-01 NOTE — TELEPHONE ENCOUNTER
Called patient to schedule 2021 yearly pharmacist appointment to discuss medications for Diabetes Management Program.    No answer. Left VM on home/cell TAD: Please call back at [phone] Option #3.     Anna Dukes, Via [employer]   Department, toll free: [phone] Option #3

## 2021-10-01 NOTE — PROGRESS NOTES
The application for Cyndee Navarro for enrollment into the diabetes management program has been reviewed and accepted on 10/01/21.     Sea Matson

## 2021-10-08 ENCOUNTER — PATIENT MESSAGE (OUTPATIENT)
Dept: PHARMACY | Age: 41
End: 2021-10-08

## 2021-10-08 NOTE — LETTER
Pam 2  [address], [address]  Phone: toll free [phone] Option #3        Mr. Todd [address] [facility] [address] [address]        Congratulations! You have completed the requirements for the [facility] Be Well with Diabetes Program and have been enrolled into the Program as of October 1st 2021. One of the requirements to participate in the [facility] Be Well with Diabetes Program is to complete a Clinical Pharmacist Telephone appointment yearly. The [address] [place] [address] Team has attempted to contact you to schedule your 2021 Diabetes Management telephone appointment but was unable to reach you. We would like to work with you and your doctor to:   - Review your medications, including over-the-counter and herbal medications   - Answer questions about your medications and how to get the most benefit from them   - Identify potential drug interactions or side effects and help fix them   - Identify preferred medications that are equally effective, but available at a lower cost to you   - Help you reach the necessary requirements to remain enrolled in the Be Well with Diabetes Program offered by [facility]       Please call [phone] and select option #3 to schedule this appointment to take advantage of this service. Telephone appointments are available Monday thru Friday from 7:30 AM till 5:30 PM.        Sincerely,   [address] [facility]   Phone: [phone] Option #3   Email: [email or web address]   Fax: [phone]

## 2021-10-08 NOTE — TELEPHONE ENCOUNTER
Second attempt made to contact patient to schedule 2021 yearly pharmacist appointment to discuss medications for Diabetes Management Program.    No answer. Left VM on home/cell TAD: Please call back at [phone] Option #3. [employer] message sent to patient.     Karlos Joy, Via [employer]   Department, toll free: [phone] Option #3

## 2021-10-26 NOTE — TELEPHONE ENCOUNTER
[employer] message not read by patient. Letter mailed.     Rita Kelsey [address] [employer]   Department, toll free: [phone] Option #3

## 2021-11-10 ENCOUNTER — TELEPHONE (OUTPATIENT)
Dept: PHARMACY | Age: 41
End: 2021-11-10

## 2021-11-10 NOTE — TELEPHONE ENCOUNTER
Patient's wife called in regarding her own missing requirements but also wanted to know about messages left for . Advised that pharmacist appointment was needed for this year for DM program. She stated she would let him know to call in to make appointment since she was unsure of his schedule.      [address]  Clinical Pharmacy   Phone: [phone]

## 2021-11-11 ENCOUNTER — TELEPHONE (OUTPATIENT)
Dept: PHARMACY | Age: 41
End: 2021-11-11

## 2021-11-11 RX ORDER — CHOLECALCIFEROL TAB 125 MCG (5000 UNIT) 125 MCG
5000 TAB ORAL DAILY
COMMUNITY

## 2021-11-11 RX ORDER — GUAIFENESIN 100 MG/5ML
81 LIQUID (ML) ORAL DAILY
COMMUNITY

## 2021-11-11 RX ORDER — CARVEDILOL 25 MG/1
25 TABLET ORAL 2 TIMES DAILY WITH MEALS
COMMUNITY

## 2021-11-11 RX ORDER — LOSARTAN POTASSIUM 100 MG/1
100 TABLET ORAL DAILY
COMMUNITY

## 2021-11-11 RX ORDER — METFORMIN HYDROCHLORIDE 1000 MG/1
1000 TABLET ORAL 2 TIMES DAILY WITH MEALS
COMMUNITY

## 2021-11-11 NOTE — LETTER
HOME DELIVERY PRESCRIPTION REQUEST  BE WELL WITH DIABETES PROGRAM  Prescriber:  Fernanda Tucker  [address] / [address] [address]  Phone: [phone]       Fax: [phone]     Patient:  [place] 1980  [address] [place] [address] [phone] (home)      Rationale:   Patient is enrolled in the [address] [place] [address],[address] Be Well With Diabetes program. Copays for certain diabetes related medications and supplies will be waived if filled through [address]  while participating in the program. 90 day supplies are preferred. Covered Medication(s) for Patient[de-identified]    Medication: aspirin 81 mg  EC      Si tab po daily        #: 90       R: 3       Prescriber Response:    Prescription(s) approved (please [place] one):  YES  NO    Other response: ________________________________________      ______________________________________   __________________  Authorized By       Date     Pharmacy: [facility]                        Phone:  [phone]    Imani Chinchilla, [facility]  Fax:  [phone]    [place], [address] [place] [address] Road     The information transmitted is intended only for the person or entity to which it is addressed and may contain confidential and/or privileged material. Any review, retransmission, dissemination or other use of, or taking of any action in reliance upon, this information by persons or entities other than the intended recipient is prohibited. This document contains information covered under the Privacy Act, 5 (a), and/or the [employer] and Accountability Act ([address]) and its various implementing regulations and must be protected in accordance with those provisions. If you received this in error, please contact the sender and delete the material from any computer.

## 2021-11-11 NOTE — LETTER
Dr. Darya Angulo PA-C  Fax: [phone]    Kandice Roscoe  1980    Your patient is currently enrolled in the Be Well with Diabetes program. The goal of this voluntary program is to help [address] employees and covered dependents reach their health maintenance goals in regards to their diabetes diagnosis. After your patient's recent visit with a [address] Clinical Pharmacist, the below were identified as opportunities to assist with their diabetes management:    - Patient interested in formal diabetic education classes due to new diagnosis  - Please order diabetic education  - please fill out information below and fax back to our office for patient to remain in our program, thank you    To remain active in the program, we require the patient to meet the following requirements and documentation must be provided by the below deadlines. Please address the following incompletions with your patient to assist patient's program involvement or return a message to the fax number below with reason/contraindication as to why patient cannot complete requirement. Initial Program Requirements (to be completed by 07/01/2021):  [] Office visit with provider for DM (1st): Completed on __________  [] A1c (1st): Completed on __________, Result: __________      Ongoing Program Requirements (to be completed by 12/31/2021):  [] Office visit with provider for DM (2nd): Completed on __________  [] A1c (2nd): Completed on __________, Result: __________   [] Lipid panel: Completed on __________, Result: __________  [] Urine microalbumin: Completed on __________, Result: __________  [] Pneumococcal vaccination (if applicable): Completed on __________  [] Influenza vaccination for 6930-0023: Completed on __________  [] On statin (list statin and dose or contraindication): __________   [] On ACEi/ARB (list medication and dose or contraindication): __________      Please contact our team with any questions.      Thank you,  [facility] Team  Telephone [phone], Option #3  Fax [phone]

## 2021-11-11 NOTE — LETTER
PRESCRIPTION REQUEST  DIABETES PROGRAM  Prescriber:  Dona Lakia  [address] [place] [address] Road / [address] [address]  Phone: [phone] Fax: [phone]     Patient:  Karlee Russell 1980  [address] [place] [address] [phone] (home)      Rationale:   Prodigy is the preferred blood glucose monitor. Meter, strips and lancets will be covered at $0 copay through the [facility] Diabetes Management Program.       Rx: Prodigy Blood Glucose Monitor   #: 1  Refills: 0  Rx: Prodigy Blood Glucose Test Strips   #: 200  Refills: 3  Rx: Prodigy Lancets     #: 200  Refills: 3    Directions: Use 2 time(s) daily or as directed to test blood glucose         Prescriber Response:    Prescription(s) approved (please [place] one):  YES  NO    Other response: ________________________________________    ______________________________________   __________________  Authorized By       Date     Pharmacy: [facility]                        Phone:  [phone]    Imani Chinchilla, [address] [facility]  Fax:  [phone]    Beth Patel, [address] [place] [address]     The information transmitted is intended only for the person or entity to which it is addressed and may contain confidential and/or privileged material. Any review, retransmission, dissemination or other use of, or taking of any action in reliance upon, this information by persons or entities other than the intended recipient is prohibited. This document contains information covered under the Privacy Act, 5 (a), and/or the [employer] and Accountability Act ([address]) and its various implementing regulations and must be protected in accordance with those provisions. If you received this in error, please contact the sender and delete the material from any computer.

## 2021-11-11 NOTE — TELEPHONE ENCOUNTER
[facility] CLINICAL PHARMACY REVIEW - Be Well with Diabetes    Vito Halsted is a 46 y.o. male enrolled in the [facility] Employee Diabetes Program. Patient provided Ish Conway with verbal consent to remain in the program for this year. Patient enrolled 10/1/21. New onset diabetes- New A1c 10. 7- written on his application but do not have the result from a lab or provider form. Medications:   Current Outpatient Medications   Medication Sig    losartan (COZAAR) 50 mg tablet Take 1 Tab by mouth daily. - 100 mg daily    carvediloL (COREG) 12.5 mg tablet Take 1 Tab by mouth two (2) times daily (with meals). - dose increased 25 mg BID    furosemide (LASIX) 40 mg tablet Take 1 Tab by mouth daily. - is off this, was on torsemide, but torsemide being held right now    albuterol (PROVENTIL HFA, VENTOLIN HFA, PROAIR HFA) 90 mcg/actuation inhaler Take 2 Puffs by inhalation every four (4) hours as needed for Wheezing. was having SOB but ended up being from his heart and not lungs    atorvastatin (LIPITOR) 20 mg tablet Take 20 mg by mouth daily.  budesonide-formoteroL (Symbicort) 80-4.5 mcg/actuation HFAA Take 2 Puffs by inhalation two (2) times a day. - was having SOB but ended up being from his heart and not lungs   Metformin 1000 BID  Vitamin D    Current Pharmacy: [address]  Current testing supplies/frequency: he did buy a meter. interested in prodigy- twice a day  Pen needles/syringes: na    Allergies: Allergies   Allergen Reactions    Codeine Other (comments)     \"I usually blackout,I feel like I am going to die. It makes me vomit. \"    Keflex [Cephalexin] Other (comments)     Rectal itching      Vitals/Labs:  BP Readings from Last 3 Encounters:   03/09/21 (!) 136/94   03/18/13 146/81     No results found for: MCACR, MCA1, MCA2, MCA3, MCAU, MCAU2, MCALPOCT  Lab Results   Component Value Date/Time    Hemoglobin A1c 5.7 (H) 03/05/2021 09:12 AM     Lab Results   Component Value Date/Time    Cholesterol, total 164 03/09/2021 03:38 AM    Cholesterol, Total 157 09/08/2021 12:00 AM    HDL Cholesterol 38 09/08/2021 12:00 AM    HDL Cholesterol 36 (L) 03/09/2021 03:38 AM    LDL, calculated 108.2 (H) 03/09/2021 03:38 AM    LDL CHOL, CALCULATED 102 09/08/2021 12:00 AM    VLDL, calculated 19.8 03/09/2021 03:38 AM    Triglyceride 84 09/08/2021 12:00 AM    CHOL/HDL Ratio 4.6 03/09/2021 03:38 AM     ALT (SGPT)   Date Value Ref Range Status   03/07/2021 56 16 - 61 U/L Final     AST (SGOT)   Date Value Ref Range Status   03/07/2021 21 10 - 38 U/L Final     The ASCVD Risk score (Zainab Jimenes et al., 2013) failed to calculate for the following reasons: The 2013 ASCVD risk score is only valid for ages 36 to 78     Lab Results   Component Value Date/Time    Creatinine 0.96 03/09/2021 03:38 AM     CrCl cannot be calculated (Patient's most recent lab result is older than the maximum 180 days allowed. ). Lab Results   Component Value Date/Time    GFR est non-AA >60 03/09/2021 03:38 AM    GFR est AA >60 03/09/2021 03:38 AM       Immunizations: There is no immunization history on file for this patient. Social History:  Social History     Tobacco Use    Smoking status: Former Smoker    Smokeless tobacco: Never Used   Substance Use Topics    Alcohol use: No     ASSESSMENT:  Initial Program Requirements (Y indicates has completed for the year, N indicates needs to be completed by 07/01/2021):  Yes - Provider Visit for DM (1st)  Yes - A1c (1st)     Ongoing Program Requirements (Y indicates has completed for the year, N indicates needs to be completed by 12/31/2021):   No - Provider Visit for DM (2nd)  No - ACC/diabetes educator visit (if A1c over 8%)  No - A1c (2nd)  Yes - Lipid panel  No - Urine microalbumin  No - Pneumococcal vaccination: [id number]  No - Influenza vaccination for Fall 2021- pt states he has bad reactions to the flu shot, he ended up in the ER-  Educated to send documentation from provider due to reaction to flu shot  No - Medication adherence over 70%  Yes - On statin - atorvastatin  Yes - On ACEi/ARB - losartan      Current medications eligible for copay waiver, up to $300, through [address]:  - aspirin, atorvastatin, losartan, metformin  - Prodigy     Diabetes Care:   - Glycemic Goal: <7.0% and directed by provider. Is not at blood glucose goal recent diagnosis. Type 2 DM under inadequate control as evidenced by 10/7 on 9/8/21 but this was new diagnosis, metformin recently started. - Current symptoms/problems include had increased urination and blurry vision- but has gone away since starting metformin  - Home blood sugar records:  fasting range: 100, also tests in the evening after dinner- is testing 30 min after eating, discussed checking 1-2 hrs after eating   - Any episodes of hypoglycemia? No- has gone to 70, discussed management of lows  - Known diabetic complications: none- but does have systolic heart failure  - Eye exam current (within one year): no, recent diagnosis but will start doing  - Foot exam current (within one year): yes  - Therapy Optimization: could consider jardiance- synjardy or GLP1 due to heart failure diagnosis  - Daily aspirin? Yes  - Medication compliance: compliant all of the time- does use pill box and brings in truck with him. - Diet compliance: compliant most of the time. Has lost some weight but weight loss started when torsemide was added, he was carrying a lot of water weight. He has changed his diet. He normally did not eat breakfast but has started to eat breakfast to take his metformin. Is a - so is harder to eat healthy. Is eating a lot of fruit- discussed watching his fruit intake. Egg, sausages for breakfast, PF cota frozen meals that he heats in the truck- with his heart stuff he has to watch salt and he is reading the labels and looking for lower carb and low sodium.  He eats a lot of salad. Is avoiding fried foods. Eats baked or boiled food for dinner. EF on echo is 27% in March- fluids restricted to 2 L per day and salt to 2g. Educated pt he should ask about entresto. - Current exercise: does some jumping jacks, push ups outside of truck. Other Considerations:  - Blood Pressure Goal: BP less than 140/90 mmHg due to history of DM: Is at blood pressure goal.   - Lipids: on atorvastatin 20 mg, sometimes some leg cramps but tolerating  - Smoking status: Quit 9 MONTHS ago- recently quit smoking     PLAN:  - Consideration(s) for provider:   · Aspirin  · Prodigy BID  · Should consider DM ed due to new diagnosis.  But wife is also DM and in our program  - DM program gaps identified:   · Initial requirements: Requirements met   · Ongoing requirements: Provider visit for DM (2nd), A1c (2nd), Urine microalbumin, Pneumococcal vaccination: [id number] and Influenza vaccination for [id number]   - Education to patient: Discussed general issues about diabetes pathophysiology and management., Addressed diet and exercise, Reminded to get yearly retinal exam., Overview of Be Well With Diabetes program and Overview of HHP   - Follow up: PCP for identified gaps or as scheduled below  - Upcoming appointments:   Future Appointments   Date Time Provider Sondra Silva   11/11/2021  7:30  [facility] [place], PharmD, [address] Pharmacist  Department: [phone]  ====================================  For Pharmacy Admin Tracking Only     CPA in place: No   Recommendation Provided To: Provider: 2 via Fax sent to office    Intervention Detail: New Rx: 2, reason: Cost/Formulary Change   Gap Closed?: Yes   Intervention Accepted By: Provider: 0   Time Spent (min): 60

## 2022-01-25 ENCOUNTER — TELEPHONE (OUTPATIENT)
Dept: PHARMACY | Age: 42
End: 2022-01-25

## 2022-01-25 NOTE — LETTER
Pam Valles  [address], [place] [address]  Phone: toll free [phone] Option #3        Emanuel Rodrigez [address] [address]       02/09/22   Dear Hoda Sadler,    [address]! You have completed the 2021 requirements for the [facility] Be Well With Diabetes Program. You have been automatically re-enrolled into the [facility] Be Well With Diabetes Program for 2022. One of the requirements to participate in the [facility] Be Well With Diabetes Program is to complete a Clinical Pharmacist Telephone appointment yearly. The Pam  Team has attempted to contact you to schedule your 2022 Diabetes Management telephone appointment but was unable to reach you. We would like to work with you and your doctor to:  - Review your medications, including over-the-counter and herbal medications  - Answer questions about your medications and how to get the most benefit from them  - Identify potential drug interactions or side effects and help fix them  - Identify preferred medications that are equally effective, but available at a lower cost to you  - Help you reach the necessary requirements to remain enrolled in the Diabetes Management Program offered by [facility]     Please call [phone] and select option #3 to schedule this appointment to take advantage of this service. Telephone appointments are available Monday thru Friday from 7:30 AM till 5:30 PM.     This is a courtesy reminder. If you have this appointment already scheduled for your 2022 enrollment in the program, please disregard this message. If you have not scheduled this appointment yet, please contact us at the above number to schedule.      Sincerely,   [address] [facility]  Phone: [phone] Option #3

## 2022-01-25 NOTE — TELEPHONE ENCOUNTER
2022 Annual Pharmacist Visit    Called patient to schedule 2022 yearly pharmacist appointment to discuss medications for Diabetes Management Program.    No answer. Left VM on 1/25/22: Please call back at [phone] Option #3.      Rubio Haider, [address] [facility] [facility]   Department, toll free: [phone] Option #3

## 2022-02-01 NOTE — TELEPHONE ENCOUNTER
Second attempt made to contact patient to schedule 2022 yearly pharmacist appointment to discuss medications for Diabetes Management Program.      No answer. Left VM on 2/1/22: Please call back at [phone] Option #3. [employer] message sent to patient.     For Christian Riddle in place: No   Recommendation Provided To: Patient/Caregiver: 1 via Telephone and LP Amina   Gap Closed?: No   Intervention Accepted By: Patient/Caregiver: 0   Time Spent (min): [address], [address]   Department, toll free: [phone] Option #3

## 2022-03-18 PROBLEM — I50.23 SYSTOLIC CHF, ACUTE ON CHRONIC (HCC): Status: ACTIVE | Noted: 2021-03-05

## 2022-03-18 PROBLEM — I42.9 CARDIOMYOPATHY (HCC): Status: ACTIVE | Noted: 2021-03-05

## 2022-03-19 PROBLEM — R73.03 PREDIABETES: Status: ACTIVE | Noted: 2021-03-05

## 2022-03-19 PROBLEM — I10 HYPERTENSION: Status: ACTIVE | Noted: 2021-03-05

## 2022-03-19 PROBLEM — I50.21 SYSTOLIC CHF, ACUTE (HCC): Status: ACTIVE | Noted: 2021-03-05

## 2022-03-22 ENCOUNTER — TELEPHONE (OUTPATIENT)
Dept: PHARMACY | Age: 42
End: 2022-03-22

## 2022-03-22 NOTE — TELEPHONE ENCOUNTER
2022 Annual Pharmacist Visit    Called patient to schedule 2022 yearly pharmacist appointment to discuss medications for Diabetes Management Program.    No answer. Left VM.      Please call back at [phone], option #3         Padmini Shepherd, [address]   Phone: [phone], option #3

## 2022-03-22 NOTE — LETTER
Pam 2  [address], [place] [address]  Phone: toll free [phone], option 3  Fax: [address] [place] [address] Road [address]           04/13/22     Dear Flo Pierce,   One of the requirements to participate in the [facility] Diabetes Management Program is to complete a Clinical Pharmacist Telephone appointment yearly. The [address] [place] [address] Team has attempted to contact you to schedule your 2022 Diabetes Management telephone appointment but was unable to reach you. We would like to work with you and your doctor to:  - Review your medications, including over-the-counter and herbal medications  - Answer questions about your medications and how to get the most benefit from them  - Identify potential drug interactions or side effects and help fix them  - Identify preferred medications that are equally effective, but available at a lower cost to you  - Help you reach the necessary requirements to remain enrolled in the Diabetes Management Program offered by [facility]     Please call [phone] and select option #3 to schedule this appointment to take advantage of this service. Telephone appointments are available Monday thru Friday from 7:30 AM till 5:30 PM.     This is a courtesy reminder. If you have this appointment already scheduled for your 2022 enrollment in the program, please disregard this message. If you have not scheduled this appointment yet, please contact us at the above number to schedule. Sincerely,   [address] [facility]  Phone: [phone], option 3  Email: [email or web address]  Fax: [phone]

## 2022-03-28 NOTE — TELEPHONE ENCOUNTER
For [place] [address] in place: No   Recommendation Provided To: Patient/Caregiver: 1 via Telephone and [address]    Gap Closed?: No   Intervention Accepted By: Patient/Caregiver: 0   Time Spent (min): 10

## 2022-03-28 NOTE — TELEPHONE ENCOUNTER
Second attempt made to contact patient to schedule 2022 yearly pharmacist appointment to discuss medications for Diabetes Management Program.    No answer. Left VM. Please call back at [phone], option #3. [employer] message sent.         Tk Reynoso, [address]   Phone: [phone], option #3

## 2022-04-27 ENCOUNTER — PATIENT OUTREACH (OUTPATIENT)
Dept: OTHER | Age: 42
End: 2022-04-27

## 2022-04-27 NOTE — PROGRESS NOTES
Telephone attempt to contact patient to offer ACM Services.  Diabetes Management. Left discreet message on voicemail with this [facility] contact information. Plan for next call: 5/17/22 Follow for potential patient engagement.

## 2022-05-04 ENCOUNTER — PATIENT MESSAGE (OUTPATIENT)
Dept: PHARMACY | Age: 42
End: 2022-05-04

## 2022-05-04 NOTE — LETTER
Pam 2  [address], [place] [address]  Phone: toll free [phone] Option #3        Mr. Jerardo Rodriguez Dr Rodrigez [address] [place] [phone]        Thanks, so much for taking the first step towards better health.       This message is a friendly reminder of the requirements that are due for the [facility] Be Well With Diabetes Program by July 1st, 2022 to avoid possible discharge from the program:     1st 2022 Provider Visit for DM (1st yearly visit)   1st 2022 A1C   2022 Annual Pharmacy Telephone Appointment   Appointments are available Monday thru Friday 7:30 AM till 5:30 PM. Please call [phone] Option #3 to schedule this telephone appointment. You will have to submit documentation of completion of requirements if your Physician does not use the [facility] electronic charting system or if you have your lab test done outside of [facility]. Return the documentation to [email or web address] or by fax at [phone]     If requirements(s) are not met by July 1st, 2022 you will be disqualified from the program and the credit valued at $600 towards your diabetic medications and supplies will be revoked. You will be able to reapply the following calendar year. Please disregard this message if the above requirements have been completed and documentation has been submitted. Pam 2   Phone: toll free [phone], option 3   Email: [email or web address]   Fax Number: [phone]

## 2022-06-01 ENCOUNTER — TELEPHONE (OUTPATIENT)
Dept: PHARMACY | Age: 42
End: 2022-06-01

## 2022-06-01 NOTE — TELEPHONE ENCOUNTER
[facility] Employee Diabetes Program    Cris Lima is a 38 y.o. male enrolled in the [facility] Diabetes Program. The goal of this voluntary program is to help employees and covered dependents reach their health maintenance goals in regards to their diabetes diagnosis. According to our records, patient is missing the following requirement(s) that must be completed by July 1, 2022 to avoid discharge from the program:     Meet with a [facility] Clinical Pharmacist   First diabetes visit with your physician in 2022   First A1c result in 2022    Plan:  Attempt made to reach patient by telephone to review above. Left voice message for patient to return clinician's phone call to [phone], option 3. Also attempted to reach [facility] and unable to lvm     Letter already sent     MAMI Madrid, PharmD, [address]  Phone: [phone]      For Pharmacy Admin Tracking Only     Time Spent (min): 5

## 2022-06-06 LAB
CHOLESTEROL, TOTAL, 804501: 158 MG/DL
HBA1C MFR BLD HPLC: 6.3 %
HDL CHOLESTEROL, 011820: 43 MG/DL
HDLC SERPL-MCNC: 3.7 MG/DL
LDL CHOL, CALCULATED: 98 MG/DL
NONHDLC SERPL-MCNC: 115 MG/DL
TRIGL SERPL-MCNC: 85 MG/DL (ref ?–150)

## 2022-06-15 ENCOUNTER — PATIENT OUTREACH (OUTPATIENT)
Dept: OTHER | Age: 42
End: 2022-06-15

## 2022-06-15 NOTE — PROGRESS NOTES
Patient identified as eligible for [address] [place] [address] services - Diabetes Management Second telephone outreach attempted. Left discreet voicemail with this CM confidential contact information. Will send UTR letter via [address] Plan for next call: 7/13/22 Follow for potential patient engagement. Deisy Calero

## 2022-06-15 NOTE — TELEPHONE ENCOUNTER
Was able to reach his [facility] [facility] today. Made her aware of missing requirements. She said he had the OV and A1c completed recently and she will send the documentation. Email and fax provided to her. She will also have him call us to complete the [facility] Visit cristofer Bhatt, PharmD, [address] [place] [address]  Phone: [phone]

## 2022-06-20 ENCOUNTER — DOCUMENTATION ONLY (OUTPATIENT)
Dept: PHARMACY | Age: 42
End: 2022-06-20

## 2022-06-20 NOTE — TELEPHONE ENCOUNTER
For [address] in place: No   Recommendation Provided To: Patient/Caregiver: 1 via Telephone   Intervention Detail: Scheduled Appointment   Gap Closed?: Yes   Intervention Accepted By: Patient/Caregiver: 1   Time Spent (min): 10

## 2022-06-21 NOTE — TELEPHONE ENCOUNTER
[facility] CLINICAL PHARMACY REVIEW - Be Well with Diabetes    Hermelinda Barlow is a 38 y.o. male enrolled in the [employer] Employee Diabetes Program. Patient provided [employer] with verbal consent to remain in the program for this year. Patient enrolled 10/1/21. Insurance through the following employer: [employer]    Medications:   Current Outpatient Medications   Medication Sig    cholecalciferol (Vitamin D3) (5000 Units/125 mcg) tab tablet Take 5,000 Units by mouth daily.  aspirin 81 mg chewable tablet Take 81 mg by mouth daily.  metFORMIN (GLUCOPHAGE) 1,000 mg tablet Take 1,000 mg by mouth two (2) times daily (with meals).  losartan (COZAAR) 100 mg tablet Take 100 mg by mouth daily.  carvediloL (Coreg) 25 mg tablet Take 25 mg by mouth two (2) times daily (with meals).  atorvastatin (LIPITOR) 20 mg tablet Take 20 mg by mouth daily. Current Pharmacy: [facility]  Current testing supplies/frequency: Prodigy testing once a day  Pen needles/syringes: na    Allergies: Allergies   Allergen Reactions    Codeine Other (comments)     \"I usually blackout,I feel like I am going to die. It makes me vomit. \"    Keflex [Cephalexin] Other (comments)     Rectal itching      Vitals/Labs:  BP Readings from Last 3 Encounters:   03/09/21 (!) 136/94   03/18/13 146/81     No results found for: MCACR, MCA1, MCA2, MCA3, MCAU, MCAU2, MCALPOCT  Lab Results   Component Value Date/Time    Hemoglobin A1c 5.7 (H) 03/05/2021 09:12 AM    Hemoglobin A1c, External 6.3 06/06/2022 12:00 AM    Hemoglobin A1c, External 10.7 09/08/2021 12:00 AM     Lab Results   Component Value Date/Time    Cholesterol, total 164 03/09/2021 03:38 AM    Cholesterol, Total 157 09/08/2021 12:00 AM    HDL Cholesterol 38 09/08/2021 12:00 AM    HDL Cholesterol 36 (L) 03/09/2021 03:38 AM    LDL, calculated 108.2 (H) 03/09/2021 03:38 AM    LDL CHOL, CALCULATED 102 09/08/2021 12:00 AM VLDL, calculated 19.8 03/09/2021 03:38 AM    Triglyceride 84 09/08/2021 12:00 AM    CHOL/HDL Ratio 4.6 03/09/2021 03:38 AM     ALT (SGPT)   Date Value Ref Range Status   03/07/2021 56 16 - 61 U/L Final     AST (SGOT)   Date Value Ref Range Status   03/07/2021 21 10 - 38 U/L Final     The 10-year ASCVD risk score (Petrona Munoz, et al., 2013) is: 5.8%    Values used to calculate the score:      Age: 38 years      Sex: Male      Is Non- : Yes      Diabetic: No      Tobacco smoker: No      Systolic Blood Pressure: 461 mmHg      Is BP treated: Yes      HDL Cholesterol: 38 MG/DL      Total Cholesterol: 157 MG/DL     Lab Results   Component Value Date/Time    Creatinine 0.96 03/09/2021 03:38 AM     CrCl cannot be calculated (Patient's most recent lab result is older than the maximum 180 days allowed. ). Lab Results   Component Value Date/Time    GFR est non-AA >60 03/09/2021 03:38 AM    GFR est AA >60 03/09/2021 03:38 AM       Immunizations: There is no immunization history on file for this patient. Social History:  Social History     Tobacco Use    Smoking status: Former Smoker    Smokeless tobacco: Never Used   Substance Use Topics    Alcohol use: No     ASSESSMENT:  Initial Program Requirements (Y indicates has completed for the year, N indicates needs to be completed by 07/01/2022): Yes - Provider Visit for DM (1st)  Yes - A1c (1st)     Ongoing Program Requirements (Y indicates has completed for the year, N indicates needs to be completed by 12/31/2022):   No - Provider Visit for DM (2nd)  No - ACC/diabetes educator visit (if A1c over 8%)  No - A1c (2nd)  No - Lipid panel  No - Urine microalbumin  No - Pneumococcal vaccination: [id number]  No - Influenza vaccination for Fall 2022  No - Medication adherence over 70%  Yes - On statin - atorvastatin  Yes - On ACEi/ARB - losartan     Current medications eligible for copay waiver, up to $600, through [address] [place]:  - aspirin, amlodipine, atorvastatin, losartan, metformin  - Prodigy     Diabetes Care:   - Glycemic Goal: <7.0% and directed by provider. Is at blood glucose goal. Type 2 DM under excellent control as evidenced by 6.3.  - Current symptoms/problems include none  - Home blood sugar records:  fasting range: 80-90  - Any episodes of hypoglycemia? no  - Known diabetic complications: none- CHF from high blood pressure per patient  - Eye exam current (within one year): yes  - Foot exam current (within one year): yes  - Medication compliance: compliant all of the time  - Diet compliance: compliant most of the time. Having higher BP but states does not add salt to any of his food. Is following a DM diet  - Current exercise: little cardio. 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 .   - Lipids: Patient has a 10-yr ASCVD risk of < 20% with DM and is therefore a candidate for moderate-intensity statin therapy based on updated guidelines.   - Smoking status: former    PLAN:  - DM program gaps identified:   · Initial requirements: Requirements met   · Ongoing requirements: Provider visit for DM (2nd), A1c (2nd), Lipid panel, Urine microalbumin, Pneumococcal vaccination: Princess Issa, Influenza vaccination for [id number] and Medication adherence over 70%   - Education to patient: Discussed general issues about diabetes pathophysiology and management., Addressed diet and exercise and Reminded to get yearly retinal exam.   - Follow up: PCP for identified gaps or as scheduled below  - Upcoming appointments:   Future Appointments   Date Time Provider Sondra Silva   6/22/2022  7:30 AM Dwayne Mon3, PharmD, [address] Pharmacist  Department: [address] Closed?: Yes   Time Spent (min): 20

## 2022-06-22 ENCOUNTER — TELEPHONE (OUTPATIENT)
Dept: PHARMACY | Age: 42
End: 2022-06-22

## 2022-06-22 RX ORDER — AMLODIPINE BESYLATE 5 MG/1
5 TABLET ORAL DAILY
COMMUNITY

## 2022-09-16 LAB — HBA1C MFR BLD HPLC: 6.6 %

## 2022-10-27 ENCOUNTER — PATIENT OUTREACH (OUTPATIENT)
Dept: OTHER | Age: 42
End: 2022-10-27

## 2022-12-02 ENCOUNTER — DOCUMENTATION ONLY (OUTPATIENT)
Dept: PHARMACY | Age: 42
End: 2022-12-02

## 2022-12-02 ENCOUNTER — TELEPHONE (OUTPATIENT)
Dept: PHARMACY | Age: 42
End: 2022-12-02

## 2022-12-02 NOTE — PROGRESS NOTES
NICHOL received for required office visit on: [address] per call to office.  Spoke with Sarah Powell

## 2022-12-16 LAB
ALBUMIN, URINE, MP1: 22
CREATINE UR-MCNC: 183.09 MG/DL
MICROALB/CREA RATIO,MCACR: 12 MG/G

## 2022-12-19 ENCOUNTER — TELEPHONE (OUTPATIENT)
Dept: PHARMACY | Age: 42
End: 2022-12-19

## 2022-12-19 NOTE — TELEPHONE ENCOUNTER
Patients wife called in regards to completing missing requirements. Per previous encounter 12/2/22  Patient has upcoming appointment 2nd OV, and needs to complete urine micro    Patient is also due for pneumonia and flu vaccine, and does not receive flu shot due to [employer] reasons. Joanna Gonzalez stated that he needs to have provider send in a note stating this. Provided wife with our departments fax #  for office to send in missing requirements.    Verbalized understanding and all questions answered

## 2022-12-20 NOTE — TELEPHONE ENCOUNTER
Provider office faxed in 2nd DM OV, 2nd A1c, Urine Microalbumin, and flu shot exemption. Still need Lipid Panel documentation.        Chris Sosa, [address]  Clinical Pharmacy   Phone: [phone]

## 2023-01-17 ENCOUNTER — TELEPHONE (OUTPATIENT)
Dept: PHARMACY | Age: 43
End: 2023-01-17

## 2023-01-17 NOTE — TELEPHONE ENCOUNTER
Pharmacy Pop Care Documentation:   Called patient with reminder for 2022 requirements for Diabetes Management Program.     According to our records, patient is missing the following 2022 requirement(s) that must be completed by January 31, 2023 to avoid being discharged from the program:     Yearly lipid panel    Called provider office, spoke to Cristi Lopez. Stated patient did have Lipid Panel done on 6/6/22. Requested documentation of this to be faxed so it can be scanned in. Will update master database with this information.         Jt Mendez, [address]   Phone: [phone]        For Pharmacy Admin Tracking Only    Program: [address]: No  Gap Closed?: Yes  Time Spent (min): 10

## 2023-01-25 ENCOUNTER — TELEPHONE (OUTPATIENT)
Dept: PHARMACY | Age: 43
End: 2023-01-25

## 2023-01-25 NOTE — TELEPHONE ENCOUNTER
2023 Annual Pharmacist Visit    **Patient is [facility]**   Called patient to schedule 2023 yearly pharmacist appointment to discuss medications for Diabetes Management Program.      No answer. Left VM on Home TAD: Please call back at [phone] Option #3.      Morena Steel, [address] [employer] [facility]   Department, toll free: [phone] Option #3

## 2023-01-30 NOTE — TELEPHONE ENCOUNTER
Patient returned call  Scheduled for 2/10/23 at 12:30  For Pharmacy Admin Tracking Only    Program: [address] in place: No  Recommendation Provided To: Patient/Caregiver: 1 via Telephone  Intervention Detail: Scheduled Appointment  Intervention Accepted By: Patient/Caregiver: 1  Gap Closed?: Yes  Time Spent (min): 15

## 2023-02-10 ENCOUNTER — TELEPHONE (OUTPATIENT)
Dept: PHARMACY | Age: 43
End: 2023-02-10

## 2023-02-10 NOTE — TELEPHONE ENCOUNTER
[facility] CLINICAL PHARMACY REVIEW - Be Well with Diabetes    Fariba Silver is a 41 y.o. male enrolled in the [address] [place] [address] Employee Diabetes Program. Patient provided Janina Guadalupe with verbal consent to remain in the program for this year. Patient enrolled 2021. Insurance through the following employer: [address] [place] [address]    Medications:   Current Outpatient Medications   Medication Sig    amLODIPine (NORVASC) 5 mg tablet Take 5 mg by mouth daily. cholecalciferol (Vitamin D3) (5000 Units/125 mcg) tab tablet Take 5,000 Units by mouth daily. aspirin 81 mg chewable tablet Take 81 mg by mouth daily. metFORMIN (GLUCOPHAGE) 1,000 mg tablet Take 1,000 mg by mouth two (2) times daily (with meals). losartan (COZAAR) 100 mg tablet Take 100 mg by mouth daily. carvediloL (Coreg) 25 mg tablet Take 25 mg by mouth two (2) times daily (with meals). atorvastatin (LIPITOR) 20 mg tablet Take 20 mg by mouth daily. Current Pharmacy: [facility]  Current testing supplies/frequency: [employer] testing once a day (states still has a lot of test strips, had bought some, no recent fills)  Pen needles/syringes: na    Allergies: Allergies   Allergen Reactions    Codeine Other (comments)     \"I usually blackout,I feel like I am going to die. It makes me vomit. \"    Keflex [Cephalexin] Other (comments)     Rectal itching      Vitals/Labs:  BP Readings from Last 3 Encounters:   03/09/21 (!) 136/94   03/18/13 146/81     Lab Results   Component Value Date/Time    Microalbumin/Creat.  Ratio 12.0 12/16/2022 12:00 AM     Lab Results   Component Value Date/Time    Hemoglobin A1c 5.7 (H) 03/05/2021 09:12 AM    Hemoglobin A1c, External 6.6 09/16/2022 12:00 AM    Hemoglobin A1c, External 6.3 06/06/2022 12:00 AM    Hemoglobin A1c, External 10.7 09/08/2021 12:00 AM     Lab Results   Component Value Date/Time    Cholesterol, total 164 03/09/2021 03:38 AM    Cholesterol, Total 158 06/06/2022 12:00 AM    HDL Cholesterol 43 06/06/2022 12:00 AM    HDL Cholesterol 36 (L) 03/09/2021 03:38 AM    LDL, calculated 108.2 (H) 03/09/2021 03:38 AM    LDL CHOL, CALCULATED 98 06/06/2022 12:00 AM    VLDL, calculated 19.8 03/09/2021 03:38 AM    Triglyceride 85 06/06/2022 12:00 AM    CHOL/HDL Ratio 4.6 03/09/2021 03:38 AM     ALT (SGPT)   Date Value Ref Range Status   03/07/2021 56 16 - 61 U/L Final     AST (SGOT)   Date Value Ref Range Status   03/07/2021 21 10 - 38 U/L Final     The 10-year ASCVD risk score (An ACLAZAR, et al., 2019) is: 6%    Values used to calculate the score:      Age: 41 years      Sex: Male      Is Non- : Yes      Diabetic: No      Tobacco smoker: No      Systolic Blood Pressure: 865 mmHg      Is BP treated: Yes      HDL Cholesterol: 43 MG/DL      Total Cholesterol: 158 MG/DL     Lab Results   Component Value Date/Time    Creatinine 0.96 03/09/2021 03:38 AM     CrCl cannot be calculated (Patient's most recent lab result is older than the maximum 180 days allowed. ). Lab Results   Component Value Date/Time    GFR est non-AA >60 03/09/2021 03:38 AM    GFR est AA >60 03/09/2021 03:38 AM       Immunizations: There is no immunization history on file for this patient.    Social History:  Social History     Tobacco Use    Smoking status: Former    Smokeless tobacco: Never   Substance Use Topics    Alcohol use: No     ASSESSMENT:  Initial Program Requirements (Y indicates has completed for the year, N indicates needs to be completed by 07/01/2023):  NO - Provider Visit for DM (1st)  NO - A1c (1st)    Ongoing Program Requirements (Y indicates has completed for the year, N indicates needs to be completed by 12/31/2023):  NO - Provider Visit for DM (2nd)  NO - ACC/diabetes educator visit (if A1c over 8%)  NO - A1c (2nd)  NO - Lipid panel  NO - Urine microalbumin  NO - Pneumococcal vaccination: [id number]  NO - Influenza vaccination for Fall 2023  NO - Medication adherence over 70%  YES - On statin - atorvastatin  YES - On ACEi/ARB- losartan     Current medications eligible for copay waiver, up to $600, through [employer] [place]:  - aspirin, amlodipine, atorvastatin, losartan, metformin  - Prodigy     Diabetes Care:   - Glycemic Goal: <7.0% and directed by provider. Is at blood glucose goal. Type 2 DM under excellent control as evidenced by < 7.  - diagnosed when A1c went to 10  - Current symptoms/problems include  none  - Home blood sugar records:  fasting range: 80-90  - Any episodes of hypoglycemia? no  - Known diabetic complications: some CHF- from high blood pressure  - Eye exam current (within one year): yes  - Foot exam current (within one year): yes  - Therapy Optimization: has been controlled   - Medication changes since last A1c: no  - Medications/Classes already tried/failed:no  - Cardiovascular disease medication considerations: consider glp1 if needed for A1c  - Daily aspirin? 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 Still was running a little high so amlodipine was increased. Pt does not remember what BP has been after the increase but states pcp was happy with it  - Lipids:  tolerating mod intensity   - Smoking status:  former    PLAN:  - epic will not let me send mychart for some reason.      - DM program gaps identified:   Initial requirements: Provider visit with provider for DM (1st) and A1c (1st)   Ongoing requirements: Provider visit for DM (2nd), A1c (2nd), Lipid panel, Urine microalbumin, Pneumococcal vaccination: [id number], and Influenza vaccination for [id number]   - Education to patient: Reminded to get yearly retinal exam.   - Follow up: PCP for identified gaps or as scheduled below  - Upcoming appointments:   No future appointments.     Jaren Valencia, PharmD, [address] Pharmacist  Department: [phone]    For Pharmacy Admin Tracking Only    Program: [address] Closed?: Yes  Time Spent (min): 30

## 2023-03-13 LAB
AVERAGE GLUCOSE: 133.78
CREATININE, URINE: 137.53
HBA1C MFR BLD: 6.6 %
MICROALBUMIN/CREAT 24H UR: 154 MG/G{CREAT}
MICROALBUMIN/CREAT UR-RTO: 112

## 2023-05-12 ENCOUNTER — CLINICAL DOCUMENTATION (OUTPATIENT)
Dept: PHARMACY | Facility: CLINIC | Age: 43
End: 2023-05-12

## 2023-05-12 NOTE — PROGRESS NOTES
Pharmacy Pop Care Documentation:      The application for Sriram Roblero for enrollment into the diabetes management program has been reviewed and accepted on 5/12/23.     [address] Only    Program: [address]:  No  Gap Closed?: Yes   Time Spent (min): 5

## 2023-06-09 ENCOUNTER — PATIENT MESSAGE (OUTPATIENT)
Dept: PHARMACY | Facility: CLINIC | Age: 43
End: 2023-06-09

## 2023-06-13 LAB
AVERAGE GLUCOSE: 123.79
CREATININE, URINE: 53.24
HBA1C MFR BLD: 6.3 %
MICROALBUMIN/CREAT 24H UR: <7 MG/G{CREAT}
MICROALBUMIN/CREAT UR-RTO: NORMAL

## 2023-06-21 NOTE — TELEPHONE ENCOUNTER
[employer] message not read by patient. Letter mailed to patient with the [employer] information.     Shaheen Castle, [address] [facility]   Department, toll free: [phone] Option #3    For Pharmacy Admin Tracking Only    Program: [address] in place:  No  Gap Closed?: No   Time Spent (min): 5

## 2023-06-30 ENCOUNTER — CLINICAL DOCUMENTATION (OUTPATIENT)
Dept: PHARMACY | Facility: CLINIC | Age: 43
End: 2023-06-30

## 2023-10-04 ENCOUNTER — PATIENT MESSAGE (OUTPATIENT)
Dept: PHARMACY | Facility: CLINIC | Age: 43
End: 2023-10-04

## 2023-10-11 NOTE — TELEPHONE ENCOUNTER
Aram message not read by patient. Letter mailed.     For Pharmacy Admin Tracking Only    Program: [address] in place:  No  Gap Closed?: No   Time Spent (min): 5

## 2023-12-12 ENCOUNTER — TELEPHONE (OUTPATIENT)
Dept: PHARMACY | Facility: CLINIC | Age: 43
End: 2023-12-12

## 2023-12-12 NOTE — TELEPHONE ENCOUNTER
Patient is currently enrolled in the Be Well with Diabetes Program.   Our records indicate that we are missing documentation of the requirement(s) listed below. If these requirement(s) are not completed by December 31, 2023, the patient may be disenrolled from the program:     Lipid Panel  3862-8020 Flu Vaccination  Pneumonia Vaccination      Spoke to patient and reminded them to complete the above requirement(s) by 12/31/23 to avoid possible discharge from the DM Program.  Patient verified understanding.         Mike Brian, [address] [place] [address]   Direct: [phone]  Phone: toll free [phone]       For Pharmacy Admin Tracking Only    Program: [address] in place:  No  Gap Closed?: No   Time Spent (min): 5

## 2024-01-22 ENCOUNTER — CLINICAL DOCUMENTATION (OUTPATIENT)
Dept: PHARMACY | Facility: CLINIC | Age: 44
End: 2024-01-22

## 2024-01-22 NOTE — PROGRESS NOTES
Pharmacy Pop Care Documentation:     Antoine Delacruz is being removed from the diabetes management program for the following reason(s): Lipid panel drawn yearly and Received yearly flu and pneumococcal vaccination (as indicated)    Megan Abdalla    For Pharmacy Admin Tracking Only    Program: [facility]  CPA in place:  No  Gap Closed?: Yes   Time Spent (min): 5

## 2024-11-08 ENCOUNTER — TELEPHONE (OUTPATIENT)
Dept: PHARMACY | Facility: CLINIC | Age: 44
End: 2024-11-08

## 2024-11-08 NOTE — TELEPHONE ENCOUNTER
Patient called in asking if he can be re-enrolled into the Diabetes Management Program since he was dis enrolled back in January of 2024. Advised patient that he may re-enroll into the program in January since it will have been a full year at that point. Patient understood. Advised patient to send in application again before the January 2025 [facility] enrollment date. Confirmed patient email as [email or web address] and application sent.       Gianluca Frye [facility]  Clinical Pharmacy   Phone: [phone], Option #3       For Pharmacy Admin Tracking Only    Program: [employer]  CPA in place:  No  Time Spent (min): 10

## 2025-01-22 ENCOUNTER — TELEPHONE (OUTPATIENT)
Dept: PHARMACY | Facility: CLINIC | Age: 45
End: 2025-01-22

## 2025-01-22 NOTE — TELEPHONE ENCOUNTER
Noted.    Megan Abdalla [facility]   Population Health Clinical   [facility] Clinical Pharmacy  Department, toll free: [phone], option 3

## 2025-01-22 NOTE — TELEPHONE ENCOUNTER
**Patient is [facility] **  Called patient to schedule 2025 yearly pharmacist appointment to discuss medications for Diabetes Management Program.    Spoke to patient and appointment scheduled for 1/27/25 at 3pm      Sol Bojorquez CPhT.   Population Health Clinical   [facility] Clinical Pharmacy  Toll free: [phone] Option 3    For Pharmacy Admin Tracking Only    Program: [address]  CPA in place:  No  Recommendation Provided To: Patient/Caregiver: 1 via Telephone  Intervention Detail: Scheduled Appointment  Intervention Accepted By: Patient/Caregiver: 1  Gap Closed?: Yes   Time Spent (min): 5

## 2025-01-23 ENCOUNTER — CLINICAL DOCUMENTATION (OUTPATIENT)
Dept: PHARMACY | Facility: CLINIC | Age: 45
End: 2025-01-23

## 2025-01-23 NOTE — PROGRESS NOTES
Received Summary for Antoine Delacruz via e-mail with the following documented: 3/18/24: [facility]: Karen Ovalles    Will accept this documentation as 2024 DM OV.    Documentation scanned into EMR under the Media Tab.    Megan Abdalla Cp   Population Health Clinical   [facility] Clinical Pharmacy  Department, toll free: [phone], option 3    For Pharmacy Admin Tracking Only    Program: [employer]  CPA in place:  No  Gap Closed?: Yes   Time Spent (min): 5

## 2025-01-27 ENCOUNTER — TELEPHONE (OUTPATIENT)
Dept: PHARMACY | Facility: CLINIC | Age: 45
End: 2025-01-27

## 2025-01-27 NOTE — TELEPHONE ENCOUNTER
[facility] CLINICAL PHARMACY REVIEW - Be Well with Diabetes ([facility])    Antoine Delacruz is a 44 y.o. male enrolled in the [facility] Employee Diabetes Program. Patient re-enrolled 01/23/2025 (Patient was enrolled in program prior to 2024).    Communication preferences (for >8% followup, urgent messages, etc)  Preferred methods: Email ([email or web address])    Medications:  Current Outpatient Medications   Medication Instructions    amLODIPine (NORVASC) 10 mg, Oral, DAILY    aspirin 81 mg, Oral, DAILY    atorvastatin (LIPITOR) 20 mg, Oral, DAILY    dapagliflozin (FARXIGA) 5 mg, Oral, EVERY MORNING    ENTRESTO 24-26 MG per tablet 1 tablet, Oral, 2 TIMES DAILY    ibuprofen (ADVIL;MOTRIN) 200 mg, Oral, EVERY 6 HOURS PRN    loratadine (CLARITIN) 10 mg, Oral, DAILY PRN    losartan (COZAAR) 100 mg, Oral, DAILY    metFORMIN (GLUCOPHAGE) 1,000 mg, Oral, 2 TIMES DAILY WITH MEALS    vitamin D3 (CHOLECALCIFEROL) 5,000 Units, Oral, DAILY     Pharmacy and Supplies Information  Current Pharmacy: [facility]  Current Testing supplies:[employer] G7     Allergies:  Allergen Reactions    Cephalexin Other (Rectal itching)    Codeine Other (\"I usually blackout,I feel like I am going to die. It makes me vomit.\")      Vitals/Labs:  BP Readings from Last 3 Encounters:   No data found for BP     Hemoglobin A1c   Component Value Date    LABA1C 5.9 03/18/2024    LABA1C 6.3 06/13/2023    LABA1C 6.6 03/13/2023     Lipid Panel   Component Value Date    CHOL 171 03/18/2024    TRIG 84 03/18/2024    HDL 43 03/18/2024     03/18/2024     ALT   Date Value Ref Range Status   03/07/2021 56 16 - 61 U/L Final     AST   Date Value Ref Range Status   03/07/2021 21 10 - 38 U/L Final     The ASCVD Risk score (Dmitry MACK, et al., 2019) failed to calculate for the following reasons:    The systolic blood pressure is missing    The smoking status is invalid     Renal Function   Component Value Date

## 2025-02-21 RX ORDER — SACUBITRIL AND VALSARTAN 24; 26 MG/1; MG/1
1 TABLET, FILM COATED ORAL 2 TIMES DAILY
COMMUNITY
Start: 2024-10-03

## 2025-02-21 RX ORDER — DAPAGLIFLOZIN 5 MG/1
5 TABLET, FILM COATED ORAL EVERY MORNING
COMMUNITY
Start: 2024-12-02

## 2025-02-21 RX ORDER — IBUPROFEN 200 MG
200 TABLET ORAL EVERY 6 HOURS PRN
COMMUNITY

## 2025-02-21 RX ORDER — LORATADINE 10 MG/1
10 CAPSULE, LIQUID FILLED ORAL DAILY PRN
COMMUNITY

## 2025-04-09 ENCOUNTER — CLINICAL DOCUMENTATION (OUTPATIENT)
Dept: PHARMACY | Facility: CLINIC | Age: 45
End: 2025-04-09

## 2025-04-09 NOTE — PROGRESS NOTES
[facility] Employee Diabetes Program - Be Well With Diabetes    Quarterly Check-in  Quarterly Reminder sent to patient for the DM Program - See Cheryl message or Letter for more information  Sent Cheryl Abdalla [facility]   Population Health Clinical   [facility] Clinical Pharmacy  Department, toll free: [phone], option 3    For Pharmacy Admin Tracking Only    Program: [employer]  CPA in place:  No  Gap Closed?: No   Time Spent (min): 5    =======================================================    Mychart/Letter for participant:    Thanks so much for taking the first step towards better health.    To ensure participants are taking steps to control their condition, ongoing requirements need to be completed. To receive the  Well With Diabetes Program 2026, the following actions must be taken:     Requirements to be completed by 12/31/25  Visit with your physician (First yearly visit)  Visit with your physician (Second yearly visit)  First A1C  Second A1C  Lipid panel (once yearly)  Urine Microalbumin (once yearly)  Flu vaccination (once yearly)  Taking a Statin, have a contraindication, or a Provider override  Taking an ACEi/ARB, have a contraindication, or a Provider override    Requirement due by 12/31/25 if A1C is greater than 8 percent:  Engage with a [facility] Associate Care Managers (ACM) or Clinical pharmacy specialists by phone at least 2 times, or complete some form of diabetes education through your provider, BeWell, etc.    *Documentation of any requirements completed or reviewed outside of the [facility] electronic medical record will need to be faxed or emailed (fax/email info below).*    **Note: If you complete a 2025 Be Well Within Screening you can have an A1C drawn at that time at no cost to you - Advise the Be Well Within Team at your screening that you are enrolled in the Be Well With

## 2025-04-21 LAB
CHOLEST SERPL-MCNC: 163 MG/DL
GLUCOSE SERPL-MCNC: 92 MG/DL (ref 74–99)
HDLC SERPL-MCNC: 42 MG/DL (ref 40–60)
LDLC SERPL CALC-MCNC: 95.8 MG/DL (ref 0–100)
TRIGL SERPL-MCNC: 126 MG/DL

## 2025-08-05 ENCOUNTER — TELEPHONE (OUTPATIENT)
Dept: PHARMACY | Facility: CLINIC | Age: 45
End: 2025-08-05

## (undated) DEVICE — GUIDEWIRE VASC L260CM DIA0.035IN RAD 3MM J TIP L7CM PTFE

## (undated) DEVICE — CATH DIAG FR4 EXPO 5FRX100CM -- EXPO

## (undated) DEVICE — SWAN-GANZ POLYMER BLEND TRUE SIZE C-TIP CONTROLCATH TD CATHETER: Brand: SWAN-GANZ CONTROLCATH TRUE SIZE

## (undated) DEVICE — PRESSURE MONITORING SET: Brand: TRUWAVE

## (undated) DEVICE — BAND RADIAL COMPR ARTERY 24CM -- REG BX/10

## (undated) DEVICE — PROCEDURE KIT FLUID MGMT 10 FR CUST MAINFOLD

## (undated) DEVICE — STOPCOCK TRNSDUC 500PSI 3 W ROT M LUER LT BLU OFF HNDL R

## (undated) DEVICE — GLIDESHEATH SLENDER STAINLESS STEEL KIT: Brand: GLIDESHEATH SLENDER

## (undated) DEVICE — SENSOR PLSE OXMTR AD CBL L36IN ADH FRM FIT SPO2 DISP

## (undated) DEVICE — TUBING PRSS MON L24IN PVC RIG NONEXPANDING M TO FEM CONN

## (undated) DEVICE — TORCON NB, ADVANTAGE CATHETER: Brand: TORCON NB

## (undated) DEVICE — PACK PROCEDURE SURG CATH CUST

## (undated) DEVICE — Device

## (undated) DEVICE — BAND RADIAL COMPR ARTERY 27CM -- LNG BX/10

## (undated) DEVICE — DRAPE,ANGIO,BRACH,STERILE,38X44: Brand: MEDLINE

## (undated) DEVICE — SHIELD RAD 14X16 IN W/ SCOOP ABSORBER